# Patient Record
Sex: FEMALE | Race: BLACK OR AFRICAN AMERICAN | NOT HISPANIC OR LATINO | URBAN - METROPOLITAN AREA
[De-identification: names, ages, dates, MRNs, and addresses within clinical notes are randomized per-mention and may not be internally consistent; named-entity substitution may affect disease eponyms.]

---

## 2018-10-20 ENCOUNTER — INPATIENT (INPATIENT)
Facility: HOSPITAL | Age: 68
LOS: 3 days | Discharge: NURSING, REHAB OR LONG-TERM CARE | DRG: 65 | End: 2018-10-24
Attending: INTERNAL MEDICINE | Admitting: INTERNAL MEDICINE
Payer: MEDICARE

## 2018-10-20 VITALS
HEIGHT: 63 IN | DIASTOLIC BLOOD PRESSURE: 103 MMHG | TEMPERATURE: 99 F | RESPIRATION RATE: 18 BRPM | SYSTOLIC BLOOD PRESSURE: 184 MMHG | OXYGEN SATURATION: 96 % | WEIGHT: 164.91 LBS | HEART RATE: 123 BPM

## 2018-10-20 LAB
ALBUMIN SERPL ELPH-MCNC: 4 G/DL — SIGNIFICANT CHANGE UP (ref 3.3–5.2)
ALP SERPL-CCNC: 95 U/L — SIGNIFICANT CHANGE UP (ref 40–120)
ALT FLD-CCNC: 19 U/L — SIGNIFICANT CHANGE UP
ANION GAP SERPL CALC-SCNC: 13 MMOL/L — SIGNIFICANT CHANGE UP (ref 5–17)
APTT BLD: 27.2 SEC — LOW (ref 27.5–37.4)
AST SERPL-CCNC: 34 U/L — HIGH
BASOPHILS # BLD AUTO: 0 K/UL — SIGNIFICANT CHANGE UP (ref 0–0.2)
BASOPHILS NFR BLD AUTO: 0.3 % — SIGNIFICANT CHANGE UP (ref 0–2)
BILIRUB SERPL-MCNC: 0.7 MG/DL — SIGNIFICANT CHANGE UP (ref 0.4–2)
BUN SERPL-MCNC: 17 MG/DL — SIGNIFICANT CHANGE UP (ref 8–20)
CALCIUM SERPL-MCNC: 10.2 MG/DL — SIGNIFICANT CHANGE UP (ref 8.6–10.2)
CHLORIDE SERPL-SCNC: 98 MMOL/L — SIGNIFICANT CHANGE UP (ref 98–107)
CO2 SERPL-SCNC: 25 MMOL/L — SIGNIFICANT CHANGE UP (ref 22–29)
CREAT SERPL-MCNC: 0.75 MG/DL — SIGNIFICANT CHANGE UP (ref 0.5–1.3)
EOSINOPHIL # BLD AUTO: 0 K/UL — SIGNIFICANT CHANGE UP (ref 0–0.5)
EOSINOPHIL NFR BLD AUTO: 0.6 % — SIGNIFICANT CHANGE UP (ref 0–6)
GLUCOSE SERPL-MCNC: 238 MG/DL — HIGH (ref 70–115)
HCT VFR BLD CALC: 41.2 % — SIGNIFICANT CHANGE UP (ref 37–47)
HGB BLD-MCNC: 13.7 G/DL — SIGNIFICANT CHANGE UP (ref 12–16)
INR BLD: 1.12 RATIO — SIGNIFICANT CHANGE UP (ref 0.88–1.16)
LYMPHOCYTES # BLD AUTO: 1.6 K/UL — SIGNIFICANT CHANGE UP (ref 1–4.8)
LYMPHOCYTES # BLD AUTO: 20.2 % — SIGNIFICANT CHANGE UP (ref 20–55)
MCHC RBC-ENTMCNC: 27.9 PG — SIGNIFICANT CHANGE UP (ref 27–31)
MCHC RBC-ENTMCNC: 33.3 G/DL — SIGNIFICANT CHANGE UP (ref 32–36)
MCV RBC AUTO: 83.9 FL — SIGNIFICANT CHANGE UP (ref 81–99)
MONOCYTES # BLD AUTO: 0.4 K/UL — SIGNIFICANT CHANGE UP (ref 0–0.8)
MONOCYTES NFR BLD AUTO: 5.3 % — SIGNIFICANT CHANGE UP (ref 3–10)
NEUTROPHILS # BLD AUTO: 5.7 K/UL — SIGNIFICANT CHANGE UP (ref 1.8–8)
NEUTROPHILS NFR BLD AUTO: 73.5 % — HIGH (ref 37–73)
PLATELET # BLD AUTO: 266 K/UL — SIGNIFICANT CHANGE UP (ref 150–400)
POTASSIUM SERPL-MCNC: 5.1 MMOL/L — SIGNIFICANT CHANGE UP (ref 3.5–5.3)
POTASSIUM SERPL-SCNC: 5.1 MMOL/L — SIGNIFICANT CHANGE UP (ref 3.5–5.3)
PROT SERPL-MCNC: 8.4 G/DL — SIGNIFICANT CHANGE UP (ref 6.6–8.7)
PROTHROM AB SERPL-ACNC: 12.3 SEC — SIGNIFICANT CHANGE UP (ref 9.8–12.7)
RBC # BLD: 4.91 M/UL — SIGNIFICANT CHANGE UP (ref 4.4–5.2)
RBC # FLD: 14 % — SIGNIFICANT CHANGE UP (ref 11–15.6)
SODIUM SERPL-SCNC: 136 MMOL/L — SIGNIFICANT CHANGE UP (ref 135–145)
TROPONIN T SERPL-MCNC: <0.01 NG/ML — SIGNIFICANT CHANGE UP (ref 0–0.06)
WBC # BLD: 7.8 K/UL — SIGNIFICANT CHANGE UP (ref 4.8–10.8)
WBC # FLD AUTO: 7.8 K/UL — SIGNIFICANT CHANGE UP (ref 4.8–10.8)

## 2018-10-20 PROCEDURE — 70496 CT ANGIOGRAPHY HEAD: CPT | Mod: 26

## 2018-10-20 PROCEDURE — 99285 EMERGENCY DEPT VISIT HI MDM: CPT

## 2018-10-20 PROCEDURE — 70498 CT ANGIOGRAPHY NECK: CPT | Mod: 26

## 2018-10-20 PROCEDURE — 93010 ELECTROCARDIOGRAM REPORT: CPT

## 2018-10-20 RX ORDER — SODIUM CHLORIDE 9 MG/ML
1000 INJECTION INTRAMUSCULAR; INTRAVENOUS; SUBCUTANEOUS ONCE
Qty: 0 | Refills: 0 | Status: COMPLETED | OUTPATIENT
Start: 2018-10-20 | End: 2018-10-20

## 2018-10-20 RX ADMIN — SODIUM CHLORIDE 4000 MILLILITER(S): 9 INJECTION INTRAMUSCULAR; INTRAVENOUS; SUBCUTANEOUS at 23:33

## 2018-10-20 NOTE — ED PROVIDER NOTE - PHYSICAL EXAMINATION
Gen: NAD, AOx3  Head: NCAT  HEENT: PERRL, EOMI, oral mucosa moist, normal conjunctiva, neck supple  Lung: CTAB, no respiratory distress  CV: tachy regular, no murmur, Normal perfusion  Abd: soft, NTND  MSK: No edema, no visible deformities  Neuro: +slurred speech, +1/5 Lt UE strength, +3/5 Lt LE strength, no ataxia, CN II-XII intact  Skin: No rash   Psych: normal affect

## 2018-10-20 NOTE — ED ADULT NURSE NOTE - CHPI ED NUR SYMPTOMS NEG
no nausea/no vomiting/no change in level of consciousness/no numbness/no loss of consciousness/no confusion/no blurred vision

## 2018-10-20 NOTE — ED ADULT TRIAGE NOTE - CHIEF COMPLAINT QUOTE
Pt c/o generalized weakness since 8:30 this morning, weakness to left arm and leg noted, states she fell out of bed this AM and needed to be helped up by family, no hx of CVA, hx of HTN

## 2018-10-20 NOTE — ED PROVIDER NOTE - MEDICAL DECISION MAKING DETAILS
patient with concerning symtpoms for stroke, last known normal >24hrs, will get priority CT r/o bleed and CTA prior to Cr for possible embolectomy. labs. ekg. FS. TBA

## 2018-10-20 NOTE — ED PROVIDER NOTE - OBJECTIVE STATEMENT
68 year old F with Lt sided weakness woke up this mornign with it, thougth would go away and it didn't, unable to walk due to weakness. not worseing. +slurred speech. no trouble swallowing. no fever/chills. no trauma. has h/o HTN and rheumatic fever as child. no other PMH

## 2018-10-20 NOTE — ED ADULT NURSE NOTE - NSIMPLEMENTINTERV_GEN_ALL_ED
Implemented All Fall Risk Interventions:  [place] to call system. Call bell, personal items and telephone within reach. Instruct patient to call for assistance. Room bathroom lighting operational. Non-slip footwear when patient is off stretcher. Physically safe environment: no spills, clutter or unnecessary equipment. Stretcher in lowest position, wheels locked, appropriate side rails in place. Provide visual cue, wrist band, yellow gown, etc. Monitor gait and stability. Monitor for mental status changes and reorient to person, place, and time. Review medications for side effects contributing to fall risk. Reinforce activity limits and safety measures with patient and family.

## 2018-10-20 NOTE — ED PROVIDER NOTE - PROGRESS NOTE DETAILS
patient with significant stroke symptoms- left sided weakness. will get priority CT and CTA and proceed without creatinine in order to optimize ability for embolectomy if appropriate -Cande PARRY spoke with Dr. Lang radiologist @ [facility]- no acute occlusion on CTA, CT head - no bleed/shift/large infarct. pending full read by neuroradiologist -Cande PARRY

## 2018-10-20 NOTE — ED ADULT NURSE NOTE - OBJECTIVE STATEMENT
Pt arrives a&ox3, speaking coherently, and following commands pt noted to have left sided weakness, more so to upper extremity that started around 08:30 this morning. As per daughter and patient, symptoms are resolving as pt was noted to have slurred speech and less use of her extremities. Pt denies any hx aside from HTN. Pt states she did not take her HTN medication today.

## 2018-10-21 DIAGNOSIS — I63.9 CEREBRAL INFARCTION, UNSPECIFIED: ICD-10-CM

## 2018-10-21 DIAGNOSIS — I10 ESSENTIAL (PRIMARY) HYPERTENSION: ICD-10-CM

## 2018-10-21 DIAGNOSIS — R73.9 HYPERGLYCEMIA, UNSPECIFIED: ICD-10-CM

## 2018-10-21 DIAGNOSIS — I63.529: ICD-10-CM

## 2018-10-21 LAB — HBA1C BLD-MCNC: 7.7 % — HIGH (ref 4–5.6)

## 2018-10-21 PROCEDURE — 93880 EXTRACRANIAL BILAT STUDY: CPT | Mod: 26

## 2018-10-21 PROCEDURE — 70551 MRI BRAIN STEM W/O DYE: CPT | Mod: 26

## 2018-10-21 PROCEDURE — 71045 X-RAY EXAM CHEST 1 VIEW: CPT | Mod: 26

## 2018-10-21 PROCEDURE — 99223 1ST HOSP IP/OBS HIGH 75: CPT

## 2018-10-21 PROCEDURE — 12345: CPT | Mod: NC

## 2018-10-21 RX ORDER — DOCUSATE SODIUM 100 MG
100 CAPSULE ORAL THREE TIMES A DAY
Qty: 0 | Refills: 0 | Status: DISCONTINUED | OUTPATIENT
Start: 2018-10-21 | End: 2018-10-24

## 2018-10-21 RX ORDER — ASPIRIN/CALCIUM CARB/MAGNESIUM 324 MG
325 TABLET ORAL DAILY
Qty: 0 | Refills: 0 | Status: DISCONTINUED | OUTPATIENT
Start: 2018-10-21 | End: 2018-10-22

## 2018-10-21 RX ORDER — AMLODIPINE BESYLATE 2.5 MG/1
2.5 TABLET ORAL DAILY
Qty: 0 | Refills: 0 | Status: DISCONTINUED | OUTPATIENT
Start: 2018-10-21 | End: 2018-10-23

## 2018-10-21 RX ORDER — INFLUENZA VIRUS VACCINE 15; 15; 15; 15 UG/.5ML; UG/.5ML; UG/.5ML; UG/.5ML
0.5 SUSPENSION INTRAMUSCULAR ONCE
Qty: 0 | Refills: 0 | Status: COMPLETED | OUTPATIENT
Start: 2018-10-21 | End: 2018-10-21

## 2018-10-21 RX ORDER — SENNA PLUS 8.6 MG/1
2 TABLET ORAL AT BEDTIME
Qty: 0 | Refills: 0 | Status: DISCONTINUED | OUTPATIENT
Start: 2018-10-21 | End: 2018-10-24

## 2018-10-21 RX ORDER — ONDANSETRON 8 MG/1
4 TABLET, FILM COATED ORAL EVERY 6 HOURS
Qty: 0 | Refills: 0 | Status: DISCONTINUED | OUTPATIENT
Start: 2018-10-21 | End: 2018-10-24

## 2018-10-21 RX ORDER — ATORVASTATIN CALCIUM 80 MG/1
20 TABLET, FILM COATED ORAL AT BEDTIME
Qty: 0 | Refills: 0 | Status: DISCONTINUED | OUTPATIENT
Start: 2018-10-21 | End: 2018-10-24

## 2018-10-21 RX ORDER — POLYETHYLENE GLYCOL 3350 17 G/17G
17 POWDER, FOR SOLUTION ORAL DAILY
Qty: 0 | Refills: 0 | Status: DISCONTINUED | OUTPATIENT
Start: 2018-10-21 | End: 2018-10-24

## 2018-10-21 RX ORDER — ENOXAPARIN SODIUM 100 MG/ML
40 INJECTION SUBCUTANEOUS DAILY
Qty: 0 | Refills: 0 | Status: DISCONTINUED | OUTPATIENT
Start: 2018-10-21 | End: 2018-10-24

## 2018-10-21 RX ADMIN — ENOXAPARIN SODIUM 40 MILLIGRAM(S): 100 INJECTION SUBCUTANEOUS at 10:54

## 2018-10-21 RX ADMIN — ONDANSETRON 4 MILLIGRAM(S): 8 TABLET, FILM COATED ORAL at 10:54

## 2018-10-21 RX ADMIN — ATORVASTATIN CALCIUM 20 MILLIGRAM(S): 80 TABLET, FILM COATED ORAL at 21:35

## 2018-10-21 RX ADMIN — Medication 100 MILLIGRAM(S): at 21:35

## 2018-10-21 RX ADMIN — Medication 325 MILLIGRAM(S): at 01:33

## 2018-10-21 RX ADMIN — SENNA PLUS 2 TABLET(S): 8.6 TABLET ORAL at 21:35

## 2018-10-21 RX ADMIN — SODIUM CHLORIDE 1000 MILLILITER(S): 9 INJECTION INTRAMUSCULAR; INTRAVENOUS; SUBCUTANEOUS at 01:31

## 2018-10-21 NOTE — H&P ADULT - PROBLEM SELECTOR PLAN 1
pt. will be admitted to stroke unit, stroke protocol, aspirin, zocor, will get MRI brain,  echo, carotid doppler. neurology consult. pt. will be admitted to stroke unit, stroke protocol, aspirin, zocor, will get MRI brain,  echo, carotid doppler. neurology consult, dr. Mullins group.

## 2018-10-21 NOTE — DISCHARGE NOTE ADULT - PATIENT PORTAL LINK FT
You can access the [facility] Patient Portal, offered by [facility], by registering with the following website: [email or web address]

## 2018-10-21 NOTE — DISCHARGE NOTE ADULT - HOSPITAL COURSE
68 year old female htn, acute cva Right Internal Capsule with left internal capsule stroke, constipation. 10/22 Patient had ONEYDA which was within within normal limits.  Patient may be discharged to rehab.     Problem/Plan - 1:  ·  Problem: Cerebrovascular accident (CVA) due to occlusion of anterior cerebral artery, unspecified blood vessel laterality.  Plan: Patient with left sided weakness. PT eval suggests rehab.  Aspirin and statin. Right Internal Capsule infarct.      Problem/Plan - 2:  ·  Problem: Essential hypertension.  Plan: BP minimally elevated with amlodipine and metoprolol.  Increase amlodipine from 2.5 to 5 mg daily and monitor bp closely.     Problem/Plan - 3:  ·  Problem: Elevated blood sugar.  Plan: Monitor glucose, continue scale coverage  A1C 7.7  Add metformin as an outpatient.     Attending Attestation:   I was physically present for the key portions of the evaluation and management (E/M) service provided.  I agree with the above history, physical, and plan which I have reviewed and edited where appropriate.     49 minutes spent on total encounter; more than 50% of the visit was spent counseling and/or coordinating care by the attending physician.

## 2018-10-21 NOTE — DISCHARGE NOTE ADULT - SECONDARY DIAGNOSIS.
Essential hypertension Mixed hyperlipidemia Type 2 diabetes mellitus with diabetic polyneuropathy, without long-term current use of insulin

## 2018-10-21 NOTE — H&P ADULT - NSHPPHYSICALEXAM_GEN_ALL_CORE
General: Well developed AA female lying in bed in NAD.   HEENT: AT, NC. PERRL. intact EOM. no throat erythema or exudate. mild left sided facial droop noted. no nystagmus.   Neck: supple. no JVD.   Chest: CTA bilaterally. no  w / r / r.   Heart: normal S1,S2. RRR. no heart murmur.  Abdomen: soft. non-tender. non-distended. + BS.  Rectal : deferred by pt.   Ext: no C/C/E. no calf tenderness.  Vascular : 2 + DP B/L.   Neuro: AAO x3. no slurred speech noted. LUE motor about 1 to 2 out of 5. LLE 3 out of 5 . diminished deep tendon reflexes over left upper / lower ext. sensory intact.   Skin: no rash noted. no warmth. no diaphoresis.   Psychiatric : pt. co-operative. mood ok. no anxiety .

## 2018-10-21 NOTE — DISCHARGE NOTE ADULT - CARE PLAN
Principal Discharge DX:	Cerebrovascular accident (CVA) due to occlusion of anterior cerebral artery, unspecified blood vessel laterality  Goal:	Prevent further stroke  Assessment and plan of treatment:	Aspirin, statin and transfer to skilled nursing facility  Secondary Diagnosis:	Essential hypertension  Goal:	Tolerable BP  Secondary Diagnosis:	Mixed hyperlipidemia  Goal:	LDL less than 70  Secondary Diagnosis:	Type 2 diabetes mellitus with diabetic polyneuropathy, without long-term current use of insulin  Goal:	Tolerable glycemia

## 2018-10-21 NOTE — H&P ADULT - PROBLEM SELECTOR PLAN 3
pt. reports no h/o DM, random blood glucose in [facility] is 238, HbA1c will be followed. will do accuchk for 24 hours and see the trend, will request day team to follow.

## 2018-10-21 NOTE — DISCHARGE NOTE ADULT - MEDICATION SUMMARY - MEDICATIONS TO TAKE
I will START or STAY ON the medications listed below when I get home from the hospital:    aspirin 325 mg oral delayed release tablet  -- 1 tab(s) by mouth once a day  -- Indication: For Cerebral infarction    metFORMIN 500 mg oral tablet  -- 1 tab(s) by mouth 2 times a day  -- Indication: For Diabetes    atorvastatin 20 mg oral tablet  -- 1 tab(s) by mouth once a day (at bedtime)  -- Indication: For Hyperlipidemia    metoprolol succinate 50 mg oral tablet, extended release  -- 1 tab(s) by mouth once a day  -- Indication: For HTN (hypertension)    Norvasc 5 mg oral tablet  -- 1 tab(s) by mouth once a day  -- Indication: For HTN (hypertension)    nystatin 100,000 units/g topical powder  -- 1 application on skin 2 times a day  -- Indication: For Rash    polyethylene glycol 3350 oral powder for reconstitution  -- 17 gram(s) by mouth once a day  -- Indication: For Constipation    senna oral tablet  -- 2 tab(s) by mouth once a day (at bedtime)  -- Indication: For Constipation    docusate sodium 100 mg oral capsule  -- 1 cap(s) by mouth 3 times a day  -- Indication: For Constipation

## 2018-10-21 NOTE — DISCHARGE NOTE ADULT - CARE PROVIDER_API CALL
Issac Cruz), Neurology  [address] [place] [address]  Phone: [phone]  Fax: [phone]

## 2018-10-21 NOTE — DISCHARGE NOTE ADULT - NS AS DC FOLLOWUP STROKE INST
Stroke (includes: TIA/SAH/ICH/Ischemic Stroke) Smoking Cessation/Stroke (includes: TIA/SAH/ICH/Ischemic Stroke) Smoking Cessation

## 2018-10-21 NOTE — DISCHARGE NOTE ADULT - PRINCIPAL DIAGNOSIS
Cerebrovascular accident (CVA) due to occlusion of anterior cerebral artery, unspecified blood vessel laterality

## 2018-10-21 NOTE — H&P ADULT - HISTORY OF PRESENT ILLNESS
69 y/o female who went to bed around 1 am on 10/20/18 without any complaints and when woke up in the morning noticed weakness in her whole left side upper more than lower. pt. tried to get up but could not due to weakness. pt. thought weakness will go away and did not come to the hospital till 9 : 00 pm on 10/20/18. As per pt. her speech was normal. no swallowing difficulty. Pt. did not notice any sig. facial droop but as per pt. her daughter noted mild left facial droop. mild headache. no LOC. no cp. no vision changes. no abd. apin. no nv/d. no fever.

## 2018-10-21 NOTE — PROGRESS NOTE ADULT - SUBJECTIVE AND OBJECTIVE BOX
SYL Lamar     Chief Complaint: Patient is a 68y old  Female who presents with a chief complaint of left sided weakness (21 Oct 2018 08:46)      PAST MEDICAL & SURGICAL HISTORY:  HTN (hypertension)  No significant past surgical history      HPI/OVERNIGHT EVENTS: Patient lying in bed constipated left side hemiparesis.    MEDICATIONS  (STANDING):  aspirin enteric coated 325 milliGRAM(s) Oral daily  atorvastatin 20 milliGRAM(s) Oral at bedtime  enoxaparin Injectable 40 milliGRAM(s) SubCutaneous daily      Vital Signs Last 24 Hrs  T(C): 37.3 (21 Oct 2018 07:45), Max: 37.3 (21 Oct 2018 07:45)  T(F): 99.1 (21 Oct 2018 07:45), Max: 99.1 (21 Oct 2018 07:45)  HR: 106 (21 Oct 2018 07:45) (96 - 123)  BP: 180/88 (21 Oct 2018 07:45) (163/89 - 184/103)  BP(mean): --  RR: 18 (21 Oct 2018 07:45) (18 - 18)  SpO2: 96% (21 Oct 2018 07:45) (96% - 99%)    PHYSICAL EXAM:  Constitutional:  Left sided hemiparesis  HEENT: PERRLA, EOMI, Normal Hearing, MMM  Neck: No LAD, No JVD  Back: Normal spine flexure, No CVA tenderness  Respiratory: CTAB Cardiovascular: S1 and S2, RRR, no M/G/R  Gastrointestinal: BS+, soft, NT/ND  Extremities: No peripheral edema  Vascular: 2+ peripheral pulses  Neurological: A/O x 3, Left side hemiparesis    CAPILLARY BLOOD GLUCOSE    LABS:                        13.7   7.8   )-----------( 266      ( 20 Oct 2018 22:31 )             41.2     10-20    136  |  98  |  17.0  ----------------------------<  238<H>  5.1   |  25.0  |  0.75    Ca    10.2      20 Oct 2018 22:31    TPro  8.4  /  Alb  4.0  /  TBili  0.7  /  DBili  x   /  AST  34<H>  /  ALT  19  /  AlkPhos  95  10-20    PT/INR - ( 20 Oct 2018 22:31 )   PT: 12.3 sec;   INR: 1.12 ratio         PTT - ( 20 Oct 2018 22:31 )  PTT:27.2 sec      RADIOLOGY & ADDITIONAL TESTS:

## 2018-10-21 NOTE — CONSULT NOTE ADULT - SUBJECTIVE AND OBJECTIVE BOX
HPI:  69 y/o female who went to bed around 1 am on 10/20/18 without any complaints and when woke up in the morning noticed weakness in her whole left side upper more than lower was alaso feeling dizziness and headache, was having headache and upper respiratory symptoms since thursday. today head and dizziness are better, but still weak on left side, no bulbar symtoms affecting speech, swallow. CTA neck and head were not showing any major vessel occlusion, but did left cerebellar hypodensity. patient started on asa, was not on any antiplatelet at home  PAST MEDICAL & SURGICAL HISTORY:  HTN (hypertension)  No significant past surgical history      REVIEW OF SYSTEMS:    CONSTITUTIONAL: No fever, weight loss, or fatigue  EYES: No eye pain, visual disturbances, or discharge  ENMT:  No difficulty hearing, tinnitus, vertigo; No sinus or throat pain  NECK: No pain or stiffness  RESPIRATORY: No cough, wheezing, chills or hemoptysis; No shortness of breath  CARDIOVASCULAR: No chest pain, palpitations, dizziness, or leg swelling  GASTROINTESTINAL: No abdominal or epigastric pain. No nausea, vomiting, or hematemesis; No diarrhea or constipation. No melena or hematochezia.  GENITOURINARY: No dysuria, frequency, hematuria, or incontinence  NEUROLOGICAL: No memory loss, loss of strength, numbness, or tremors  SKIN: No itching, burning, rashes, or lesions   LYMPH NODES: No enlarged glands  ENDOCRINE: No heat or cold intolerance; No hair loss  MUSCULOSKELETAL: No joint pain or swelling; No muscle, back, or extremity pain  PSYCHIATRIC: No depression, anxiety, mood swings, or difficulty sleeping  HEME/LYMPH: No easy bruising, or bleeding gums    MEDICATIONS  (STANDING):  aspirin enteric coated 325 milliGRAM(s) Oral daily  atorvastatin 20 milliGRAM(s) Oral at bedtime  enoxaparin Injectable 40 milliGRAM(s) SubCutaneous daily    MEDICATIONS  (PRN):      Allergies    No Known Allergies    Intolerances        SOCIAL HISTORY:    FAMILY HISTORY:  Family history of cerebrovascular accident (CVA)      PHYSICAL EXAM:  Vital Signs Last 24 Hrs  T(F): 99.1 (10-21-18 @ 07:45)  HR: 106 (10-21-18 @ 07:45)  BP: 180/88 (10-21-18 @ 07:45)  RR: 18 (10-21-18 @ 07:45)    GENERAL: NAD, well-groomed, well-developed  HEAD:  Atraumatic, Normocephalic  EYES: EOMI, PERRLA,   NECK: Supple, no carotid bruit bilateral  NERVOUS SYSTEM:  Alert & Oriented X3, speech and language normal, cranial nerves II-XII normal,   Good concentration; Motor Strength 5/5 right  upper and lower extremities; left ue and left LE 3-3+/5, DTRs 1+ intact and symmetric, plantar responses flexor right. extensor  left side.  sensory intact  light touch.   HEART: Regular rate and rhythm; No murmurs, rubs, or gallops          LABS:                        13.7   7.8   )-----------( 266      ( 20 Oct 2018 22:31 )             41.2     10-20    136  |  98  |  17.0  ----------------------------<  238<H>  5.1   |  25.0  |  0.75    Ca    10.2      20 Oct 2018 22:31    TPro  8.4  /  Alb  4.0  /  TBili  0.7  /  DBili  x   /  AST  34<H>  /  ALT  19  /  AlkPhos  95  10-20    PT/INR - ( 20 Oct 2018 22:31 )   PT: 12.3 sec;   INR: 1.12 ratio         PTT - ( 20 Oct 2018 22:31 )  PTT:27.2 sec      RADIOLOGY & ADDITIONAL STUDIES:

## 2018-10-21 NOTE — ED ADULT NURSE REASSESSMENT NOTE - NS ED NURSE REASSESS COMMENT FT1
at US
discussed plan of care with patient patient is comfortable and to be cleaned iv 20g intact SL at this time.
Pt. is resting comfortably, in no apparent distress, family at bedside, call bell within reach, stretcher in lowest position with side rails up. Pt. neuro status the same as this morning, see full neuro assessment. Pt. and family educated on plan of care. BP consistently at 180/80, HR 85, 99.0 F oral temp, RR 18, 99% O2 sat on room air. Will continue to monitor pt. and transfer to appropriate assigned unit.

## 2018-10-21 NOTE — DISCHARGE NOTE ADULT - PLAN OF CARE
Prevent further stroke Aspirin, statin and transfer to skilled nursing facility Tolerable BP LDL less than 70 Tolerable glycemia

## 2018-10-22 DIAGNOSIS — I10 ESSENTIAL (PRIMARY) HYPERTENSION: ICD-10-CM

## 2018-10-22 LAB
ANION GAP SERPL CALC-SCNC: 12 MMOL/L — SIGNIFICANT CHANGE UP (ref 5–17)
BUN SERPL-MCNC: 12 MG/DL — SIGNIFICANT CHANGE UP (ref 8–20)
CALCIUM SERPL-MCNC: 9.7 MG/DL — SIGNIFICANT CHANGE UP (ref 8.6–10.2)
CHLORIDE SERPL-SCNC: 102 MMOL/L — SIGNIFICANT CHANGE UP (ref 98–107)
CHOLEST SERPL-MCNC: 196 MG/DL — SIGNIFICANT CHANGE UP (ref 110–199)
CO2 SERPL-SCNC: 24 MMOL/L — SIGNIFICANT CHANGE UP (ref 22–29)
CREAT SERPL-MCNC: 0.7 MG/DL — SIGNIFICANT CHANGE UP (ref 0.5–1.3)
GLUCOSE BLDC GLUCOMTR-MCNC: 131 MG/DL — HIGH (ref 70–99)
GLUCOSE SERPL-MCNC: 151 MG/DL — HIGH (ref 70–115)
HCT VFR BLD CALC: 38.1 % — SIGNIFICANT CHANGE UP (ref 37–47)
HDLC SERPL-MCNC: 65 MG/DL — SIGNIFICANT CHANGE UP
HGB BLD-MCNC: 12.5 G/DL — SIGNIFICANT CHANGE UP (ref 12–16)
LIPID PNL WITH DIRECT LDL SERPL: 108 MG/DL — SIGNIFICANT CHANGE UP
MAGNESIUM SERPL-MCNC: 2 MG/DL — SIGNIFICANT CHANGE UP (ref 1.6–2.6)
MCHC RBC-ENTMCNC: 27.9 PG — SIGNIFICANT CHANGE UP (ref 27–31)
MCHC RBC-ENTMCNC: 32.8 G/DL — SIGNIFICANT CHANGE UP (ref 32–36)
MCV RBC AUTO: 85 FL — SIGNIFICANT CHANGE UP (ref 81–99)
PHOSPHATE SERPL-MCNC: 3.8 MG/DL — SIGNIFICANT CHANGE UP (ref 2.4–4.7)
PLATELET # BLD AUTO: 216 K/UL — SIGNIFICANT CHANGE UP (ref 150–400)
POTASSIUM SERPL-MCNC: 4.1 MMOL/L — SIGNIFICANT CHANGE UP (ref 3.5–5.3)
POTASSIUM SERPL-SCNC: 4.1 MMOL/L — SIGNIFICANT CHANGE UP (ref 3.5–5.3)
RBC # BLD: 4.48 M/UL — SIGNIFICANT CHANGE UP (ref 4.4–5.2)
RBC # FLD: 14.1 % — SIGNIFICANT CHANGE UP (ref 11–15.6)
SODIUM SERPL-SCNC: 138 MMOL/L — SIGNIFICANT CHANGE UP (ref 135–145)
TOTAL CHOLESTEROL/HDL RATIO MEASUREMENT: 3 RATIO — LOW (ref 3.3–7.1)
TRIGL SERPL-MCNC: 116 MG/DL — SIGNIFICANT CHANGE UP (ref 10–200)
TSH SERPL-MCNC: 1.39 UIU/ML — SIGNIFICANT CHANGE UP (ref 0.27–4.2)
WBC # BLD: 6.2 K/UL — SIGNIFICANT CHANGE UP (ref 4.8–10.8)
WBC # FLD AUTO: 6.2 K/UL — SIGNIFICANT CHANGE UP (ref 4.8–10.8)

## 2018-10-22 PROCEDURE — 93312 ECHO TRANSESOPHAGEAL: CPT | Mod: 26

## 2018-10-22 PROCEDURE — 99233 SBSQ HOSP IP/OBS HIGH 50: CPT

## 2018-10-22 PROCEDURE — 76376 3D RENDER W/INTRP POSTPROCES: CPT | Mod: 26

## 2018-10-22 PROCEDURE — 93320 DOPPLER ECHO COMPLETE: CPT | Mod: 26

## 2018-10-22 PROCEDURE — 93306 TTE W/DOPPLER COMPLETE: CPT | Mod: 26

## 2018-10-22 PROCEDURE — 93325 DOPPLER ECHO COLOR FLOW MAPG: CPT | Mod: 26

## 2018-10-22 RX ORDER — ASPIRIN/CALCIUM CARB/MAGNESIUM 324 MG
325 TABLET ORAL DAILY
Qty: 0 | Refills: 0 | Status: DISCONTINUED | OUTPATIENT
Start: 2018-10-22 | End: 2018-10-24

## 2018-10-22 RX ORDER — METOPROLOL TARTRATE 50 MG
50 TABLET ORAL DAILY
Qty: 0 | Refills: 0 | Status: DISCONTINUED | OUTPATIENT
Start: 2018-10-22 | End: 2018-10-24

## 2018-10-22 RX ADMIN — SENNA PLUS 2 TABLET(S): 8.6 TABLET ORAL at 21:39

## 2018-10-22 RX ADMIN — ATORVASTATIN CALCIUM 20 MILLIGRAM(S): 80 TABLET, FILM COATED ORAL at 21:39

## 2018-10-22 RX ADMIN — Medication 50 MILLIGRAM(S): at 02:21

## 2018-10-22 RX ADMIN — Medication 100 MILLIGRAM(S): at 21:39

## 2018-10-22 RX ADMIN — AMLODIPINE BESYLATE 2.5 MILLIGRAM(S): 2.5 TABLET ORAL at 04:54

## 2018-10-22 RX ADMIN — ENOXAPARIN SODIUM 40 MILLIGRAM(S): 100 INJECTION SUBCUTANEOUS at 11:40

## 2018-10-22 RX ADMIN — Medication 325 MILLIGRAM(S): at 11:40

## 2018-10-22 NOTE — CONSULT NOTE ADULT - ATTENDING COMMENTS
Acute infarct of the right internal capsule in a hypertensive patient  Scheduled for ONEYDA on Monday to r/o cardioembolic source.

## 2018-10-22 NOTE — CONSULT NOTE ADULT - ATTENDING COMMENTS
Patient seen and examined with resident physician, Dr. Swain. Agree with above history and physical examination. Ms. Pizano presented with acute onset left sided hemiparesis and was found to have acute right internal capsule ischemic CVA. She has a family history of CVA (father) and co-morbid factors including HTN and newly diagnosed DM. Cardiac evaluation is pending. She is progressing well with bedside therapy. When she is medically cleared, would recommend a course of acute inpatient rehabilitation for the functional deficits consisting of 3 hours of therapy/day & 24 hour RN/daily PMR physician for comorbid medical management. Patient seen and examined on 10/23.   Agree with resident and fellow.  Rehab - Recommend ACUTE inpatient rehabilitation for the functional deficits consisting of 3 hours of therapy/day & 24 hour RN/daily PMR physician for comorbid medical management. Will continue to follow for ongoing rehab needs and recommendations. Patient will be able to tolerate 3 hours a day.    Continue bedside therapy as well as OOB throughout the day with mobilization throughout the day with staff to maintain cardiopulmonary function and prevention of secondary complications related to debility.

## 2018-10-22 NOTE — PHYSICAL THERAPY INITIAL EVALUATION ADULT - IMPAIRMENTS FOUND, PT EVAL
aerobic capacity/endurance/ROM/muscle strength/neuromotor development and sensory integration/gait, locomotion, and balance

## 2018-10-22 NOTE — PROGRESS NOTE ADULT - SUBJECTIVE AND OBJECTIVE BOX
[facility]     Chief Complaint: Patient is a 68y old  Female who presents with a chief complaint of left sided weakness (22 Oct 2018 10:21)      PAST MEDICAL & SURGICAL HISTORY:  HTN (hypertension)  No significant past surgical history      HPI/OVERNIGHT EVENTS: Patient sitting in chair, awaiting ONEYDA    MEDICATIONS  (STANDING):  amLODIPine   Tablet 2.5 milliGRAM(s) Oral daily  aspirin enteric coated 325 milliGRAM(s) Oral daily  atorvastatin 20 milliGRAM(s) Oral at bedtime  docusate sodium 100 milliGRAM(s) Oral three times a day  enoxaparin Injectable 40 milliGRAM(s) SubCutaneous daily  influenza   Vaccine 0.5 milliLiter(s) IntraMuscular once  metoprolol succinate ER 50 milliGRAM(s) Oral daily  polyethylene glycol 3350 17 Gram(s) Oral daily  senna 2 Tablet(s) Oral at bedtime      Vital Signs Last 24 Hrs  T(C): 37.2 (22 Oct 2018 13:58), Max: 37.4 (22 Oct 2018 00:07)  T(F): 98.9 (22 Oct 2018 13:58), Max: 99.3 (22 Oct 2018 00:07)  HR: 90 (22 Oct 2018 11:46) (75 - 103)  BP: 171/62 (22 Oct 2018 11:46) (114/93 - 185/72)  BP(mean): 93 (22 Oct 2018 11:46) (93 - 117)  RR: 16 (22 Oct 2018 11:46) (14 - 21)  SpO2: 98% (22 Oct 2018 11:46) (96% - 100%)    PHYSICAL EXAM:  Constitutional:  Left sided hemiparesis arms worse than legs  HEENT: PERRLA, EOMI, Normal Hearing, MMM  Neck: No LAD, No JVD  Back: Normal spine flexure, No CVA tenderness  Respiratory: CTAB Cardiovascular: S1 and S2, RRR, no M/G/R  Gastrointestinal: BS+, soft, NT/ND  Extremities: No peripheral edema  Vascular: 2+ peripheral pulses  Neurological: A/O x 3 left sided jessica paresis arms worse than legs    CAPILLARY BLOOD GLUCOSE    LABS:                        12.5   6.2   )-----------( 216      ( 22 Oct 2018 06:17 )             38.1     10-22    138  |  102  |  12.0  ----------------------------<  151<H>  4.1   |  24.0  |  0.70    Ca    9.7      22 Oct 2018 06:17  Phos  3.8     10-22  Mg     2.0     10-22    TPro  8.4  /  Alb  4.0  /  TBili  0.7  /  DBili  x   /  AST  34<H>  /  ALT  19  /  AlkPhos  95  10-20    PT/INR - ( 20 Oct 2018 22:31 )   PT: 12.3 sec;   INR: 1.12 ratio         PTT - ( 20 Oct 2018 22:31 )  PTT:27.2 sec      RADIOLOGY & ADDITIONAL TESTS:

## 2018-10-22 NOTE — PHYSICAL THERAPY INITIAL EVALUATION ADULT - ACTIVE RANGE OF MOTION EXAMINATION, REHAB EVAL
left UE 2-/5, left LE 3-/5/Right UE Active ROM was WFL (within functional limits)/Right LE Active ROM was WFL (within functional limits)

## 2018-10-22 NOTE — OCCUPATIONAL THERAPY INITIAL EVALUATION ADULT - PRECAUTIONS/LIMITATIONS, REHAB EVAL
aspiration precautions/fall precautions/supervision with meals; head of bed 30 degrees/seizure precautions

## 2018-10-22 NOTE — PROGRESS NOTE ADULT - ASSESSMENT
68 year old female htn, acute cva Right Internal Capsule with left internal capsule stroke, constipation. 10/22 Patient scheduled for ONEYDA.

## 2018-10-22 NOTE — OCCUPATIONAL THERAPY INITIAL EVALUATION ADULT - MANUAL MUSCLE TESTING RESULTS, REHAB EVAL
left shoulder grossly assessment with partial AROM against gravity 2/5, left elbow grossly assessed with AROM against gravity 3/5, left gross grasp 3/5

## 2018-10-22 NOTE — OCCUPATIONAL THERAPY INITIAL EVALUATION ADULT - MODIFIED CLINICAL TEST OF SENSORY INTEGRATION IN BALANCE TEST
standing balance = poor, pt with difficulty maintaining upright posture in standing requiring maximum assistance

## 2018-10-22 NOTE — OCCUPATIONAL THERAPY INITIAL EVALUATION ADULT - GENERAL OBSERVATIONS, REHAB EVAL
Received pt seated in bedside chair, + IV lock, +telemetry, CCC present with pt finishing assessment; pt agrees to OT.

## 2018-10-22 NOTE — OCCUPATIONAL THERAPY INITIAL EVALUATION ADULT - PLANNED THERAPY INTERVENTIONS, OT EVAL
ADL retraining/balance training/fine motor coordination training/motor coordination training/bed mobility training/strengthening/transfer training/neuromuscular re-education/ROM/toilet

## 2018-10-22 NOTE — CONSULT NOTE ADULT - SUBJECTIVE AND OBJECTIVE BOX
History obtained by: Patient and medical record     obtained: No    Chief complaint:  "I had a stroke"    HPI:  This is 69 y/o female with hx of HTN and untreated DM who went to sleep on 10/20/18 without any complains and when she woke up in the morning she noticed weakness in her hwole left side. Speech was normal, no swallowing difficulty but she did have a mild facial droop. Head CT showed moderate size infarct in the inferior right cerebellar hemisphere. Brain MRI confirmed acute infarct.   Pt states she did not take her Toprol for 2 days prior to the stroke. Her BP was 184/103 on arrival to the ER. Pt denies chest pain, palpitations, SOB or leg swelling. She had rheumatic fever as a child but never underwent any cardiac testing or experienced any cardiac issues. Telemetry shows regular rhythm 80's-110's, no ectopy. EKG reveals  bpm with no acute ST changes. Pt never smoked, denies ETOH or drug use.        REVIEW OF SYMPTOMS:   Cardiovascular:  as per HPI  Respiratory:  denies dyspnea,   cough,     Genitourinary:  No dysuria, no hematuria;   Gastrointestinal:   No dark color stool, no melena, no diarrhea, no constipation, no abdominal pain;   Neurological: as per HPI  Psychiatric: No agitation, no anxiety.  ALL OTHER REVIEW OF SYSTEMS ARE NEGATIVE.    MEDICATIONS  (STANDING):  amLODIPine   Tablet 2.5 milliGRAM(s) Oral daily  aspirin enteric coated 325 milliGRAM(s) Oral daily  atorvastatin 20 milliGRAM(s) Oral at bedtime  docusate sodium 100 milliGRAM(s) Oral three times a day  enoxaparin Injectable 40 milliGRAM(s) SubCutaneous daily  influenza   Vaccine 0.5 milliLiter(s) IntraMuscular once  polyethylene glycol 3350 17 Gram(s) Oral daily  senna 2 Tablet(s) Oral at bedtime    MEDICATIONS  (PRN):  ondansetron Injectable 4 milliGRAM(s) IV Push every 6 hours PRN Nausea and/or Vomiting        PAST MEDICAL & SURGICAL HISTORY:  HTN (hypertension)  No significant past surgical history      FAMILY HISTORY:  Family history of cerebrovascular accident (CVA)      SOCIAL HISTORY: lives with daughter and grandchildren    CIGARETTES: never smoked    ALCOHOL: denies    DRUGS: denies      Vital Signs Last 24 Hrs  T(C): 37.4 (22 Oct 2018 00:07), Max: 37.4 (22 Oct 2018 00:07)  T(F): 99.3 (22 Oct 2018 00:07), Max: 99.3 (22 Oct 2018 00:07)  HR: 85 (21 Oct 2018 20:30) (85 - 106)  BP: 114/93 (21 Oct 2018 20:30) (114/93 - 180/88)  BP(mean): 101 (21 Oct 2018 20:30) (101 - 115)  RR: 18 (21 Oct 2018 20:30) (18 - 20)  SpO2: 100% (21 Oct 2018 20:30) (96% - 100%)    PHYSICAL EXAM:  General: WN/WD NAD  Neurology: A&Ox3, left side weakness  Eyes: PERRL/ EOMI, Gross vision intact  ENT/Neck: Neck supple, trachea midline, No JVD, Gross hearing intact  Respiratory: CTA B/L, No wheezing, rales, rhonchi  CV: RRR, S1S2, no murmurs  Abdominal: Soft, NT, ND +BS,   Extremities: No edema, + peripheral pulses  Skin: No Rashes, Hematoma, Ecchymosis        INTERPRETATION OF TELEMETRY: SR-ST 80's-110's, no ectopy    ECG:  bpm      I&O's Detail    21 Oct 2018 07:01  -  22 Oct 2018 00:54  --------------------------------------------------------  IN:    Oral Fluid: 100 mL  Total IN: 100 mL    OUT:  Total OUT: 0 mL    Total NET: 100 mL          LABS:                        13.7   7.8   )-----------( 266      ( 20 Oct 2018 22:31 )             41.2     10-20    136  |  98  |  17.0  ----------------------------<  238<H>  5.1   |  25.0  |  0.75    Ca    10.2      20 Oct 2018 22:31    TPro  8.4  /  Alb  4.0  /  TBili  0.7  /  DBili  x   /  AST  34<H>  /  ALT  19  /  AlkPhos  95  10-20    CARDIAC MARKERS ( 20 Oct 2018 22:31 )  x     / <0.01 ng/mL / x     / x     / x          PT/INR - ( 20 Oct 2018 22:31 )   PT: 12.3 sec;   INR: 1.12 ratio         PTT - ( 20 Oct 2018 22:31 )  PTT:27.2 sec    I&O's Summary    21 Oct 2018 07:01  -  22 Oct 2018 00:54  --------------------------------------------------------  IN: 100 mL / OUT: 0 mL / NET: 100 mL        RADIOLOGY & ADDITIONAL STUDIES:    < from: CT Angio Head w/ IV Cont (10.20.18 @ 22:46) >  IMPRESSION:   Noncontrast head CT scan: Moderate sized in the inferior right cerebellar   hemisphere that is probably acute.  Follow-up MRI can be obtained for   further characterization.      CT angiography neck: The cervical vasculature is patent without evidence   of atherosclerosis. No hemodynamically significant carotid stenosis or   flow-limiting vertebral artery stenosis.  No evidence of dissection.    CT angiography brain: No major vessel occlusion, stenosis or aneurysm   about the [place] of .      TRISHA PARKINSON M.D.,ATTENDING RADIOLOGIST  This document has been electronically signed. Oct 21 2018 12:03AM    < end of copied text >    < from: MR Head No Cont (10.21.18 @ 13:47) >  IMPRESSION:      1)  acute infarct in the region of the posterior limb of the right   internal capsule..  2)  chronic ischemic changes in both hemispheres. No acute posterior   fossa lesion or acuteinfarct noted..     CASEY BERKOWITZ M.D., ATTENDING RADIOLOGIST  This document has been electronically signed. Oct 21 2018  1:53PM    < end of copied text >      PREVIOUS DIAGNOSTIC TESTING:      ECHO: n/a    STRESS: n/a      CATHETERIZATION: n/a

## 2018-10-22 NOTE — CONSULT NOTE ADULT - ASSESSMENT
possible stroke, although other possiblities neoplasm, abcess also possible, mri report still not avaialble, cta negative for cerebrovascular disease, if mri +for stroke then will need cardiac naylor including ONEYDA and ILR. ok to c/w asa for the time being.
67 y/o female presents with left side weakness and mild facial droop, found to have acute infarct of the right internal capsule.

## 2018-10-22 NOTE — CONSULT NOTE ADULT - PROBLEM SELECTOR RECOMMENDATION 9
see above
-start ASA and statin  -ONEYDA to r/o cardiogenic emboli etiology  -telemetry monitoring  -carotid dopplers

## 2018-10-22 NOTE — PHYSICAL THERAPY INITIAL EVALUATION ADULT - ADDITIONAL COMMENTS
Pt. lives in the private house with family, 4 steps to enter (+) rail, (-) DME, (+) driving. Family not available to assist pt. upon D/C home due to full time job.

## 2018-10-22 NOTE — PHYSICAL THERAPY INITIAL EVALUATION ADULT - CRITERIA FOR SKILLED THERAPEUTIC INTERVENTIONS
risk reduction/prevention/rehab potential/anticipated discharge recommendation/functional limitations in following categories/impairments found/therapy frequency/anticipated equipment needs at discharge/predicted duration of therapy intervention

## 2018-10-22 NOTE — OCCUPATIONAL THERAPY INITIAL EVALUATION ADULT - SENSORY TESTS
pt denies changes with sensation; pt with +bilateral pedal pulses and +left radial pulse; pt with +capillary refill in left digits

## 2018-10-22 NOTE — OCCUPATIONAL THERAPY INITIAL EVALUATION ADULT - ADDITIONAL COMMENTS
Pt lives in house with 3 [address] and no steps inside; bedroom and bathroom are on main level. Bathroom has bathtub with curtains. Pt does not own any DME. Pt is right handed. Pt drives.

## 2018-10-22 NOTE — OCCUPATIONAL THERAPY INITIAL EVALUATION ADULT - PERTINENT HX OF CURRENT PROBLEM, REHAB EVAL
Pt presents to ED with c/o left sided weakness, inability to walk and slurred speech. MRI head with acute infarct in the region of the posterior limb of the right internal capsule; chronic ischemic changes in both hemispheres; no acute posterior fossa lesion or acute infarct noted.

## 2018-10-22 NOTE — CONSULT NOTE ADULT - SUBJECTIVE AND OBJECTIVE BOX
Patient is a 68y old  Female who presents with a chief complaint of left sided weakness (22 Oct 2018 00:53)    HPI:  69 y/o female with PMH HTN, rheumatic fever, and ?new dx of diabetes HBa1c 7.7, who went to bed around 1 am on 10/20/18 without any complaints and when woke up in the morning noticed weakness in her whole left side upper more than lower. pt. tried to get up but could not due to weakness. pt. thought weakness will go away and did not come to the hospital till 9 : 00 pm on 10/20/18. As per pt. her speech was normal. no swallowing difficulty. Pt. did not notice any sig. facial droop but her daughter noted mild left facial droop. Denies LOC, chest pain, vision changes at the time. She did not take her BP medications for 2 days prior to her symptoms with /103 on arrival. CT head showed  possible acute mod sized infarct in the right inferior cerebellar hemisphere. MRI head found acute infarct in the region of the posterior limb of the right internal capsule. Patient undergoing cardiac workup for stroke etiology.     Imaging showed:  Noncontrast head CT scan (10/20) : Moderate sized infarct in the inferior right cerebellar hemisphere that is probably acute.      CT angiography neck (10/20): The cervical vasculature is patent without evidence of atherosclerosis.  No hemodynamically significant carotid stenosis or flow-limiting vertebral artery stenosis.  No evidence of dissection.    CT angiography brain (10/20) : No major vessel occlusion, stenosis or aneurysm about the [place] of .    MRI Head (10/21)     -acute infarct in the region of the posterior limb of the right internal capsule.  -chronic ischemic changes in both hemispheres. No acute posterior fossa lesion or acute infarct noted.    Carotid Dopplers (10/21): No significant plaque burden or hemodynamically significant stenosis.    REVIEW OF SYSTEMS  Constitutional - No fever, No weight loss, No fatigue  HEENT - No eye pain, No visual disturbances, No difficulty hearing, No tinnitus, No vertigo, No neck pain  Respiratory - No cough, No wheezing, No shortness of breath  Cardiovascular - No chest pain, No palpitations  Gastrointestinal - No abdominal pain, No nausea, No vomiting, No diarrhea, No constipation  Genitourinary - No dysuria, No frequency, No hematuria, No incontinence  Neurological - No headaches, No memory loss, No loss of strength, No numbness, No tremors  Skin - No itching, No rashes, No lesions   Endocrine - No temperature intolerance  Musculoskeletal - No joint pain, No joint swelling, No muscle pain  Psychiatric - No depression, No anxiety    VITALS  T(C): 37.2 (10-22-18 @ 08:33), Max: 37.4 (10-22-18 @ 00:07)  HR: 75 (10-22-18 @ 07:49) (75 - 103)  BP: 158/100 (10-22-18 @ 07:49) (114/93 - 185/72)  RR: 15 (10-22-18 @ 07:49) (14 - 21)  SpO2: 96% (10-22-18 @ 07:49) (96% - 100%)  Wt(kg): --    PAST MEDICAL & SURGICAL HISTORY  HTN (hypertension)  No significant past surgical history      SOCIAL HISTORY  Smoking - Denied  EtOH - Denied   Drugs - Denied    FUNCTIONAL HISTORY  Lives   Independent    CURRENT FUNCTIONAL STATUS  Pending Eval      FAMILY HISTORY   Family history of cerebrovascular accident (CVA)  No pertinent family history in first degree relatives      RECENT LABS/IMAGING  CBC Full  -  ( 22 Oct 2018 06:17 )  WBC Count : 6.2 K/uL  Hemoglobin : 12.5 g/dL  Hematocrit : 38.1 %  Platelet Count - Automated : 216 K/uL  Mean Cell Volume : 85.0 fl  Mean Cell Hemoglobin : 27.9 pg  Mean Cell Hemoglobin Concentration : 32.8 g/dL  Auto Neutrophil # : x  Auto Lymphocyte # : x  Auto Monocyte # : x  Auto Eosinophil # : x  Auto Basophil # : x  Auto Neutrophil % : x  Auto Lymphocyte % : x  Auto Monocyte % : x  Auto Eosinophil % : x  Auto Basophil % : x    10-22    138  |  102  |  12.0  ----------------------------<  151<H>  4.1   |  24.0  |  0.70    Ca    9.7      22 Oct 2018 06:17  Phos  3.8     10-22  Mg     2.0     10-22    TPro  8.4  /  Alb  4.0  /  TBili  0.7  /  DBili  x   /  AST  34<H>  /  ALT  19  /  AlkPhos  95  10-20    Lipid Profile (10.22.18 @ 06:17)    Direct LDL: 108: LDL Cholesterol --- Interpretive Comment (for adults 18 and over)  Optimal LDL Level may vary based on clinical situation  Below 70                  Ideal for people at very high risk of heart  disease  Below 100                Ideal for people at risk of heart disease  100 - 129                   [place]  130 - 159                   Borderline high  160 - 189                   High  190 and Above          Very high mg/dL        ALLERGIES  No Known Allergies      MEDICATIONS   amLODIPine   Tablet 2.5 milliGRAM(s) Oral daily  aspirin enteric coated 325 milliGRAM(s) Oral daily  atorvastatin 20 milliGRAM(s) Oral at bedtime  docusate sodium 100 milliGRAM(s) Oral three times a day  enoxaparin Injectable 40 milliGRAM(s) SubCutaneous daily  influenza   Vaccine 0.5 milliLiter(s) IntraMuscular once  metoprolol succinate ER 50 milliGRAM(s) Oral daily  ondansetron Injectable 4 milliGRAM(s) IV Push every 6 hours PRN  polyethylene glycol 3350 17 Gram(s) Oral daily  senna 2 Tablet(s) Oral at bedtime Patient is a 68y old  Female who presents with a chief complaint of left sided weakness (22 Oct 2018 00:53)    HPI:  69 y/o female with PMH HTN, rheumatic fever, and ?new dx of diabetes HBa1c 7.7, who went to bed around 1 am on 10/20/18 without any complaints and when woke up in the morning noticed weakness in her whole left side upper more than lower. pt. tried to get up but could not due to weakness. pt. thought weakness will go away and did not come to the hospital till 9 : 00 pm on 10/20/18. As per pt. her speech was normal. no swallowing difficulty. Pt. did not notice any sig. facial droop but her daughter noted mild left facial droop. Denies LOC, chest pain, vision changes at the time. She did not take her BP medications for 2 days prior to her symptoms with /103 on arrival. CT head showed  possible acute mod sized infarct in the right inferior cerebellar hemisphere. MRI head found acute infarct in the region of the posterior limb of the right internal capsule. Patient undergoing cardiac workup for stroke etiology.   Patient has no complaints at this time.    Imaging showed:  Noncontrast head CT scan (10/20) : Moderate sized infarct in the inferior right cerebellar hemisphere that is probably acute.      CT angiography neck (10/20): The cervical vasculature is patent without evidence of atherosclerosis.  No hemodynamically significant carotid stenosis or flow-limiting vertebral artery stenosis.  No evidence of dissection.    CT angiography brain (10/20) : No major vessel occlusion, stenosis or aneurysm about the [place] of .    MRI Head (10/21)     -acute infarct in the region of the posterior limb of the right internal capsule.  -chronic ischemic changes in both hemispheres. No acute posterior fossa lesion or acute infarct noted.    Carotid Dopplers (10/21): No significant plaque burden or hemodynamically significant stenosis.    REVIEW OF SYSTEMS  Constitutional - No fever, No weight loss, No fatigue  HEENT - No eye pain, No visual disturbances, No difficulty hearing, No tinnitus, No vertigo, No neck pain  Respiratory - No cough, No wheezing, No shortness of breath  Cardiovascular - No chest pain, No palpitations  Gastrointestinal - No abdominal pain, No nausea, No vomiting, No diarrhea, no constipation  Genitourinary - No dysuria, No frequency, No hematuria, No incontinence  Neurological - No headaches, No memory loss, No numbness, No tremors, admit left upper extremity weakness  Skin - No itching, No rashes, No lesions   Musculoskeletal - No joint pain, No joint swelling, No muscle pain  Psychiatric - No depression, No anxiety    VITALS  T(C): 37.2 (10-22-18 @ 08:33), Max: 37.4 (10-22-18 @ 00:07)  HR: 75 (10-22-18 @ 07:49) (75 - 103)  BP: 158/100 (10-22-18 @ 07:49) (114/93 - 185/72)  RR: 15 (10-22-18 @ 07:49) (14 - 21)  SpO2: 96% (10-22-18 @ 07:49) (96% - 100%)  Wt(kg): --    PAST MEDICAL & SURGICAL HISTORY  HTN (hypertension)  No significant past surgical history      SOCIAL HISTORY  Smoking - Denied  EtOH - Denied   Drugs - Denied    FUNCTIONAL HISTORY  Lives with her daughter and her grandchildren in and private home with 3 DARLENE with railing one side. No steps once inside the house. Bathroom with tub, no shower rail/ seat. Patient was independent with ambulation and adls including driving.    CURRENT FUNCTIONAL STATUS  Bed: min- mod assist  Transfers: Mod assist  Gait mod assist x 2 people hand held      FAMILY HISTORY   Family history of cerebrovascular accident (CVA)  No pertinent family history in first degree relatives      RECENT LABS/IMAGING  CBC Full  -  ( 22 Oct 2018 06:17 )  WBC Count : 6.2 K/uL  Hemoglobin : 12.5 g/dL  Hematocrit : 38.1 %  Platelet Count - Automated : 216 K/uL  Mean Cell Volume : 85.0 fl  Mean Cell Hemoglobin : 27.9 pg  Mean Cell Hemoglobin Concentration : 32.8 g/dL  Auto Neutrophil # : x  Auto Lymphocyte # : x  Auto Monocyte # : x  Auto Eosinophil # : x  Auto Basophil # : x  Auto Neutrophil % : x  Auto Lymphocyte % : x  Auto Monocyte % : x  Auto Eosinophil % : x  Auto Basophil % : x    10-22    138  |  102  |  12.0  ----------------------------<  151<H>  4.1   |  24.0  |  0.70    Ca    9.7      22 Oct 2018 06:17  Phos  3.8     10-22  Mg     2.0     10-22    TPro  8.4  /  Alb  4.0  /  TBili  0.7  /  DBili  x   /  AST  34<H>  /  ALT  19  /  AlkPhos  95  10-20    Lipid Profile (10.22.18 @ 06:17)    Direct LDL: 108: LDL Cholesterol --- Interpretive Comment (for adults 18 and over)  Optimal LDL Level may vary based on clinical situation  Below 70                  Ideal for people at very high risk of heart  disease  Below 100                Ideal for people at risk of heart disease  100 - 129                   [place]  130 - 159                   Borderline high  160 - 189                   High  190 and Above          Very high mg/dL        ALLERGIES  No Known Allergies      MEDICATIONS   amLODIPine   Tablet 2.5 milliGRAM(s) Oral daily  aspirin enteric coated 325 milliGRAM(s) Oral daily  atorvastatin 20 milliGRAM(s) Oral at bedtime  docusate sodium 100 milliGRAM(s) Oral three times a day  enoxaparin Injectable 40 milliGRAM(s) SubCutaneous daily  influenza   Vaccine 0.5 milliLiter(s) IntraMuscular once  metoprolol succinate ER 50 milliGRAM(s) Oral daily  ondansetron Injectable 4 milliGRAM(s) IV Push every 6 hours PRN  polyethylene glycol 3350 17 Gram(s) Oral daily  senna 2 Tablet(s) Oral at bedtime    ----------------------------------------------------------------------------------------  PHYSICAL EXAM  Constitutional - NAD, Comfortable  HEENT - NCAT, EOMI  Neck - Supple, No limited ROM  Chest - Breathing comfortably, No wheezing  Cardiovascular - S1S2   Abdomen - Soft   Extremities - No C/C/E, No calf tenderness   Neurologic Exam -                    Cognitive - Awake, Alert, AAO to self, place, date, year, situation     Communication - Fluent, No dysarthria     Cranial Nerves - CN 2-12 intact except mild left lower face droop     Motor - No focal deficits                    LEFT    UE - ShAB 2/5, EF 3/5, EE 2/5, WE 2/5,  5/5                    RIGHT UE - ShAB 4/5, EF 5/5, EE 5/5, WE 5/5,  5/5                    LEFT    LE - HF 4/5, KE 5/5, DF 5/5, PF 5/5                    RIGHT LE - HF 4/5, KE 5/5, DF 5/5, PF 5/5        Sensory - Intact to LT     Reflexes - DTR Intact except for hyperreflexive on left knee and + babinski on left     OculoVestibular - No saccades, No nystagmus, VOR      Psychiatric - Mood stable, Affect WNL Patient is a 68y old  Female who presents with a chief complaint of left sided weakness (22 Oct 2018 00:53)    HPI:  67 y/o female with PMH HTN, rheumatic fever, and ?new dx of diabetes HBa1c 7.7, who went to bed around 1 am on 10/20/18 without any complaints and when woke up in the morning noticed weakness in her whole left side upper more than lower. pt. tried to get up but could not due to weakness. pt. thought weakness will go away and did not come to the hospital till 9 : 00 pm on 10/20/18. As per pt. her speech was normal. no swallowing difficulty. Pt. did not notice any sig. facial droop but her daughter noted mild left facial droop. Denies LOC, chest pain, vision changes at the time. She did not take her BP medications for 2 days prior to her symptoms with /103 on arrival. CT head showed  possible acute mod sized infarct in the right inferior cerebellar hemisphere. MRI head found acute infarct in the region of the posterior limb of the right internal capsule. Patient undergoing cardiac workup for stroke etiology.   Patient has no complaints at this time.    Imaging showed:  Noncontrast head CT scan (10/20) : Moderate sized infarct in the inferior right cerebellar hemisphere that is probably acute.      CT angiography neck (10/20): The cervical vasculature is patent without evidence of atherosclerosis.  No hemodynamically significant carotid stenosis or flow-limiting vertebral artery stenosis.  No evidence of dissection.    CT angiography brain (10/20) : No major vessel occlusion, stenosis or aneurysm about the [place] of .    MRI Head (10/21)     -acute infarct in the region of the posterior limb of the right internal capsule.  -chronic ischemic changes in both hemispheres. No acute posterior fossa lesion or acute infarct noted.    Carotid Dopplers (10/21): No significant plaque burden or hemodynamically significant stenosis.    REVIEW OF SYSTEMS  Constitutional - No fever, No weight loss, No fatigue  HEENT - No eye pain, No visual disturbances, No difficulty hearing, No tinnitus, No vertigo, No neck pain  Respiratory - No cough, No wheezing, No shortness of breath  Cardiovascular - No chest pain, No palpitations  Gastrointestinal - No abdominal pain, No nausea, No vomiting, No diarrhea, no constipation  Genitourinary - No dysuria, No frequency, No hematuria, No incontinence  Neurological - No headaches, No memory loss, No numbness, No tremors, admit left upper extremity weakness  Skin - No itching, No rashes, No lesions   Musculoskeletal - No joint pain, No joint swelling, No muscle pain  Psychiatric - No depression, No anxiety    VITALS  T(C): 37.2 (10-22-18 @ 08:33), Max: 37.4 (10-22-18 @ 00:07)  HR: 75 (10-22-18 @ 07:49) (75 - 103)  BP: 158/100 (10-22-18 @ 07:49) (114/93 - 185/72)  RR: 15 (10-22-18 @ 07:49) (14 - 21)  SpO2: 96% (10-22-18 @ 07:49) (96% - 100%)  Wt(kg): --    PAST MEDICAL & SURGICAL HISTORY  HTN (hypertension)  No significant past surgical history    SOCIAL HISTORY  Smoking - Denied  EtOH - Denied   Drugs - Denied    FUNCTIONAL HISTORY  Lives with her daughter and her grandchildren in and private home with 3 DARLENE with railing one side. No steps once inside the house. Bathroom with tub, no shower rail/ seat. Patient was independent with ambulation and adls including driving.    CURRENT FUNCTIONAL STATUS  Bed: min- mod assist  Transfers: Mod assist  Gait mod assist x 2 people hand held    FAMILY HISTORY   Family history of cerebrovascular accident (CVA)  No pertinent family history in first degree relatives      RECENT LABS/IMAGING  CBC Full  -  ( 22 Oct 2018 06:17 )  WBC Count : 6.2 K/uL  Hemoglobin : 12.5 g/dL  Hematocrit : 38.1 %  Platelet Count - Automated : 216 K/uL  Mean Cell Volume : 85.0 fl  Mean Cell Hemoglobin : 27.9 pg  Mean Cell Hemoglobin Concentration : 32.8 g/dL  Auto Neutrophil # : x  Auto Lymphocyte # : x  Auto Monocyte # : x  Auto Eosinophil # : x  Auto Basophil # : x  Auto Neutrophil % : x  Auto Lymphocyte % : x  Auto Monocyte % : x  Auto Eosinophil % : x  Auto Basophil % : x    10-22    138  |  102  |  12.0  ----------------------------<  151<H>  4.1   |  24.0  |  0.70    Ca    9.7      22 Oct 2018 06:17  Phos  3.8     10-22  Mg     2.0     10-22    TPro  8.4  /  Alb  4.0  /  TBili  0.7  /  DBili  x   /  AST  34<H>  /  ALT  19  /  AlkPhos  95  10-20    Lipid Profile (10.22.18 @ 06:17)    Direct LDL: 108: LDL Cholesterol --- Interpretive Comment (for adults 18 and over)  Optimal LDL Level may vary based on clinical situation  Below 70                  Ideal for people at very high risk of heart  disease  Below 100                Ideal for people at risk of heart disease  100 - 129                   [place]  130 - 159                   Borderline high  160 - 189                   High  190 and Above          Very high mg/dL        ALLERGIES  No Known Allergies      MEDICATIONS   amLODIPine   Tablet 2.5 milliGRAM(s) Oral daily  aspirin enteric coated 325 milliGRAM(s) Oral daily  atorvastatin 20 milliGRAM(s) Oral at bedtime  docusate sodium 100 milliGRAM(s) Oral three times a day  enoxaparin Injectable 40 milliGRAM(s) SubCutaneous daily  influenza   Vaccine 0.5 milliLiter(s) IntraMuscular once  metoprolol succinate ER 50 milliGRAM(s) Oral daily  ondansetron Injectable 4 milliGRAM(s) IV Push every 6 hours PRN  polyethylene glycol 3350 17 Gram(s) Oral daily  senna 2 Tablet(s) Oral at bedtime    ----------------------------------------------------------------------------------------  PHYSICAL EXAM  Constitutional - NAD, Comfortable  HEENT - NCAT, EOMI  Neck - Supple, No limited ROM  Chest - Breathing comfortably, No wheezing  Cardiovascular - S1S2   Abdomen - Soft   Extremities - No C/C/E, No calf tenderness   Neurologic Exam -                    Cognitive - Awake, Alert, AAO to self, place, date, year, situation     Communication - Fluent, No dysarthria     Cranial Nerves - CN 2-12 intact except mild left lower face droop     Motor - No focal deficits                    LEFT    UE - ShAB 2/5, EF 3/5, EE 2/5, WE 2/5,  5/5                    RIGHT UE - ShAB 4/5, EF 5/5, EE 5/5, WE 5/5,  5/5                    LEFT    LE - HF 4/5, KE 5/5, DF 5/5, PF 5/5                    RIGHT LE - HF 4/5, KE 5/5, DF 5/5, PF 5/5        Sensory - Intact to LT     Reflexes - DTR Intact except for hyperreflexive on left knee and + babinski on left     OculoVestibular - No saccades, No nystagmus, VOR      Psychiatric - Mood stable, Affect WNL    ------------------------------------------------------------------------------------------------  ASSESSMENT/PLAN  68yFemale with functional deficits after Right internal capsule CVA  -Ischemic CVA: continue statin, s/s eval, c/w aspirin, consider high dose aspirin as tolerated, pending cardiac eval  -Pain - Tylenol  -HTN: blood pressure elvated on exam, started on metoprolol and amlodipine  -New diagnosis diabetes: ISS coverage, diabetic education, lifestyle modifications  -DVT PPX - SCDs, lovenox  -Rehab - once completed medical/cardiac workup/ s/s eval   Recommend ACUTE inpatient rehabilitation for the functional deficits consisting of 3 hours of therapy/day & 24 hour RN/daily PMR physician for comorbid medical management. Will continue to follow for ongoing rehab needs and recommendations. Patient will be able to tolerate 3 hours a day. HPI:  67 y/o female with PMH HTN, rheumatic fever, and ?new dx of diabetes HBa1c 7.7, who went to bed around 1 am on 10/20/18 without any complaints and when woke up in the morning noticed weakness in her whole left side upper more than lower. She tried to get up but could not due to weakness. She thought that the weakness would go away however it did not prompting her to come to the hospital on 10/20/18. As per pt. her speech was normal with no swallowing difficulty. Pt. did not notice any sig. facial droop but her daughter noted mild left facial droop. She did not take her BP medications for 2 days prior to her symptoms with /103 on arrival. CT head showed  possible acute mod sized infarct in the right inferior cerebellar hemisphere. MRI head found acute infarct in the region of the posterior limb of the right internal capsule. Patient undergoing cardiac workup for stroke etiology.     Patient has no complaints at this time and states that her symptoms of left upper extremity weakness seem to be improving    REVIEW OF SYSTEMS  Constitutional - No fever, No weight loss, No fatigue  HEENT - No eye pain, No visual disturbances, No difficulty hearing, No tinnitus, No vertigo, No neck pain  Respiratory - No cough, No wheezing, No shortness of breath  Cardiovascular - No chest pain, No palpitations  Gastrointestinal - No abdominal pain, No nausea, No vomiting, No diarrhea, no constipation  Genitourinary - No dysuria, No frequency, No hematuria, No incontinence  Neurological - No headaches, No memory loss, No numbness, No tremors, (+)left upper extremity weakness  Skin - No itching, No rashes, No lesions   Musculoskeletal - No joint pain, No joint swelling, No muscle pain  Psychiatric - No depression, No anxiety    VITALS  T(C): 37.2 (10-22-18 @ 08:33), Max: 37.4 (10-22-18 @ 00:07)  HR: 75 (10-22-18 @ 07:49) (75 - 103)  BP: 158/100 (10-22-18 @ 07:49) (114/93 - 185/72)  RR: 15 (10-22-18 @ 07:49) (14 - 21)  SpO2: 96% (10-22-18 @ 07:49) (96% - 100%)  Wt(kg): --    PAST MEDICAL & SURGICAL HISTORY  HTN (hypertension)  No significant past surgical history    FAMILY HISTORY   Family history of cerebrovascular accident - Father    ALLERGIES  No Known Allergies    SOCIAL HISTORY  Smoking - Denied  EtOH - Denied   Drugs - Denied    FUNCTIONAL HISTORY  Lives with her daughter and her grandchildren in and private home with 3 DARLENE with railing one side. No steps once inside the house. Bathroom with tub, no shower rail/ seat. Patient was independent with ambulation and adls including driving.    CURRENT FUNCTIONAL STATUS  Bed: min- mod assist  Transfers: Mod assist  Gait mod assist x 2 people hand held    PHYSICAL EXAM  Constitutional - NAD, Comfortable  HEENT - NCAT, EOMI  Neck - Supple, No limited ROM  Chest - Breathing comfortably, No wheezing  Cardiovascular - S1S2   Abdomen - Soft   Extremities - No C/C/E, No calf tenderness   Neurologic Exam -                    Cognitive - Awake, Alert, AAO to self, place, date, year, situation     Communication - Fluent, No dysarthria     Cranial Nerves - CN 2-12 intact except mild left lower face droop     Motor - No focal deficits                    LEFT    UE - ShAB 2/5, EF 3/5, EE 2/5, WE 2/5,  5/5                    RIGHT UE - ShAB 4/5, EF 5/5, EE 5/5, WE 5/5,  5/5                    LEFT    LE - HF 4/5, KE 5/5, DF 5/5, PF 5/5                    RIGHT LE - HF 4/5, KE 5/5, DF 5/5, PF 5/5        Sensory - Intact to LT     Reflexes - DTR Intact except for hyperreflexive on left knee and + babinski on left     OculoVestibular - No saccades, No nystagmus, VOR      Psychiatric - Mood stable, Affect WNL    RECENT LABS/IMAGING             12.5   6.2   )-----------( 216      ( 22 Oct 2018 06:17 )             38.1     138  |  102  |  12.0  ----------------------------<  151<H>  4.1   |  24.0  |  0.70    Ca    9.7      22 Oct 2018 06:17  Phos  3.8     10-22  Mg     2.0     10-22    TPro  8.4  /  Alb  4.0  /  TBili  0.7  /  DBili  x   /  AST  34<H>  /  ALT  19  /  AlkPhos  95  10-20    PT/INR - ( 20 Oct 2018 22:31 )   PT: 12.3 sec;   INR: 1.12 ratio    PTT - ( 20 Oct 2018 22:31 )  PTT:27.2 sec    Lipid Profile (10.22.18 @ 06:17)    Direct LDL: 108:     Imaging showed:  Noncontrast head CT scan (10/20) : Moderate sized infarct in the inferior right cerebellar hemisphere that is probably acute.      CT angiography neck (10/20): The cervical vasculature is patent without evidence of atherosclerosis.  No hemodynamically significant carotid stenosis or flow-limiting vertebral artery stenosis.  No evidence of dissection.    CT angiography brain (10/20) : No major vessel occlusion, stenosis or aneurysm about the [place] of .    MRI Head (10/21)     -acute infarct in the region of the posterior limb of the right internal capsule.  -chronic ischemic changes in both hemispheres. No acute posterior fossa lesion or acute infarct noted.    Carotid Dopplers (10/21): No significant plaque burden or hemodynamically significant stenosis.    MEDICATIONS  MEDICATIONS  (STANDING):  amLODIPine   Tablet 2.5 milliGRAM(s) Oral daily  aspirin enteric coated 325 milliGRAM(s) Oral daily  atorvastatin 20 milliGRAM(s) Oral at bedtime  docusate sodium 100 milliGRAM(s) Oral three times a day  enoxaparin Injectable 40 milliGRAM(s) SubCutaneous daily  influenza   Vaccine 0.5 milliLiter(s) IntraMuscular once  metoprolol succinate ER 50 milliGRAM(s) Oral daily  polyethylene glycol 3350 17 Gram(s) Oral daily  senna 2 Tablet(s) Oral at bedtime    MEDICATIONS  (PRN):  ondansetron Injectable 4 milliGRAM(s) IV Push every 6 hours PRN Nausea and/or Vomiting    ASSESSMENT/PLAN  68yFemale with functional deficits after right internal capsule CVA  -Ischemic CVA: continue statin, s/s eval, c/w aspirin, pending cardiac eval  -Pain - Tylenol  -HTN: blood pressure elevated on exam, started on metoprolol and amlodipine  -New diagnosis diabetes: ISS coverage, diabetic education, lifestyle modifications  -DVT PPX - SCDs, lovenox  -Rehab - once completed medical/cardiac workup/ s/s eval, recommend ACUTE inpatient rehabilitation for the functional deficits consisting of 3 hours of therapy/day & 24 hour RN/daily PMR physician for comorbid medical management. Will continue to follow for ongoing rehab needs and recommendations. Patient will be able to tolerate 3 hours a day.

## 2018-10-22 NOTE — PROGRESS NOTE ADULT - SUBJECTIVE AND OBJECTIVE BOX
INTERVAL HISTORY:  feels improved but still weak      VITAL SIGNS:  Vital Signs Last 24 Hrs  T(C): 37.2 (22 Oct 2018 08:33), Max: 37.4 (22 Oct 2018 00:07)  T(F): 99 (22 Oct 2018 08:33), Max: 99.3 (22 Oct 2018 00:07)  HR: 75 (22 Oct 2018 07:49) (75 - 103)  BP: 158/100 (22 Oct 2018 07:49) (114/93 - 185/72)  BP(mean): 117 (22 Oct 2018 07:49) (101 - 117)  RR: 15 (22 Oct 2018 07:49) (14 - 21)  SpO2: 96% (22 Oct 2018 07:49) (96% - 100%)    PHYSICAL EXAMINATION:    Mentation:  nl  Language/Speech: nl  CN: nl  Visual Fields: full  Motor: 4/5 left hemniparesis with decreased left hand dexterity and toe tap  Sensory: slight dec on left  DTR:  Babinski:      MEDS:  MEDICATIONS  (STANDING):  amLODIPine   Tablet 2.5 milliGRAM(s) Oral daily  aspirin enteric coated 325 milliGRAM(s) Oral daily  atorvastatin 20 milliGRAM(s) Oral at bedtime  docusate sodium 100 milliGRAM(s) Oral three times a day  enoxaparin Injectable 40 milliGRAM(s) SubCutaneous daily  influenza   Vaccine 0.5 milliLiter(s) IntraMuscular once  metoprolol succinate ER 50 milliGRAM(s) Oral daily  polyethylene glycol 3350 17 Gram(s) Oral daily  senna 2 Tablet(s) Oral at bedtime    MEDICATIONS  (PRN):  ondansetron Injectable 4 milliGRAM(s) IV Push every 6 hours PRN Nausea and/or Vomiting      LABS:                          12.5   6.2   )-----------( 216      ( 22 Oct 2018 06:17 )             38.1     10-22    138  |  102  |  12.0  ----------------------------<  151<H>  4.1   |  24.0  |  0.70    Ca    9.7      22 Oct 2018 06:17  Phos  3.8     10-22  Mg     2.0     10-22    TPro  8.4  /  Alb  4.0  /  TBili  0.7  /  DBili  x   /  AST  34<H>  /  ALT  19  /  AlkPhos  95  10-20    LIVER FUNCTIONS - ( 20 Oct 2018 22:31 )  Alb: 4.0 g/dL / Pro: 8.4 g/dL / ALK PHOS: 95 U/L / ALT: 19 U/L / AST: 34 U/L / GGT: x               RADIOLOGY & ADDITIONAL STUDIES:      < from: MR Head No Cont (10.21.18 @ 13:47) >  MPRESSION:      1)  acute infarct in the region of the posterior limb of the right   internal capsule..  2)  chronic ischemic changes in both hemispheres. No acute posterior   fossa lesion or acuteinfarct noted..       < end of copied text >    Carotid duplex and CTA are neg for significant stenosis    IMPRESSION & PLAN:      Hypertensive, diabetic lacunar infarct of the right internal capsule  no large vessel disease    REC:  Medical and Cardiac evaluation and treatment as indicated- TTE and ONEYDA pending  Antiplatelet therapy as prescribed.  Cerebrovascular risk factor assessment and management- DM, HTN  PT/Rehab, dispo

## 2018-10-22 NOTE — OCCUPATIONAL THERAPY INITIAL EVALUATION ADULT - RANGE OF MOTION EXAMINATION
left shoulder flexion to about 90 degrees AROM, left shoulder abduction to about 90 degrees AROM, left elbow flexion/extension AROM WFL with increased time, left gross grasp and digit extension AROM WFL

## 2018-10-23 LAB
ANION GAP SERPL CALC-SCNC: 12 MMOL/L — SIGNIFICANT CHANGE UP (ref 5–17)
BUN SERPL-MCNC: 18 MG/DL — SIGNIFICANT CHANGE UP (ref 8–20)
CALCIUM SERPL-MCNC: 9.5 MG/DL — SIGNIFICANT CHANGE UP (ref 8.6–10.2)
CHLORIDE SERPL-SCNC: 103 MMOL/L — SIGNIFICANT CHANGE UP (ref 98–107)
CO2 SERPL-SCNC: 23 MMOL/L — SIGNIFICANT CHANGE UP (ref 22–29)
CREAT SERPL-MCNC: 0.63 MG/DL — SIGNIFICANT CHANGE UP (ref 0.5–1.3)
GLUCOSE BLDC GLUCOMTR-MCNC: 101 MG/DL — HIGH (ref 70–99)
GLUCOSE BLDC GLUCOMTR-MCNC: 118 MG/DL — HIGH (ref 70–99)
GLUCOSE BLDC GLUCOMTR-MCNC: 142 MG/DL — HIGH (ref 70–99)
GLUCOSE BLDC GLUCOMTR-MCNC: 99 MG/DL — SIGNIFICANT CHANGE UP (ref 70–99)
GLUCOSE SERPL-MCNC: 115 MG/DL — SIGNIFICANT CHANGE UP (ref 70–115)
HCT VFR BLD CALC: 38.4 % — SIGNIFICANT CHANGE UP (ref 37–47)
HGB BLD-MCNC: 12.5 G/DL — SIGNIFICANT CHANGE UP (ref 12–16)
MAGNESIUM SERPL-MCNC: 2.1 MG/DL — SIGNIFICANT CHANGE UP (ref 1.8–2.6)
MCHC RBC-ENTMCNC: 27.9 PG — SIGNIFICANT CHANGE UP (ref 27–31)
MCHC RBC-ENTMCNC: 32.6 G/DL — SIGNIFICANT CHANGE UP (ref 32–36)
MCV RBC AUTO: 85.7 FL — SIGNIFICANT CHANGE UP (ref 81–99)
PHOSPHATE SERPL-MCNC: 4.3 MG/DL — SIGNIFICANT CHANGE UP (ref 2.4–4.7)
PLATELET # BLD AUTO: 200 K/UL — SIGNIFICANT CHANGE UP (ref 150–400)
POTASSIUM SERPL-MCNC: 4.3 MMOL/L — SIGNIFICANT CHANGE UP (ref 3.5–5.3)
POTASSIUM SERPL-SCNC: 4.3 MMOL/L — SIGNIFICANT CHANGE UP (ref 3.5–5.3)
RBC # BLD: 4.48 M/UL — SIGNIFICANT CHANGE UP (ref 4.4–5.2)
RBC # FLD: 14 % — SIGNIFICANT CHANGE UP (ref 11–15.6)
SODIUM SERPL-SCNC: 138 MMOL/L — SIGNIFICANT CHANGE UP (ref 135–145)
WBC # BLD: 4.7 K/UL — LOW (ref 4.8–10.8)
WBC # FLD AUTO: 4.7 K/UL — LOW (ref 4.8–10.8)

## 2018-10-23 PROCEDURE — 99233 SBSQ HOSP IP/OBS HIGH 50: CPT

## 2018-10-23 PROCEDURE — 99222 1ST HOSP IP/OBS MODERATE 55: CPT | Mod: GC

## 2018-10-23 RX ORDER — NYSTATIN CREAM 100000 [USP'U]/G
1 CREAM TOPICAL
Qty: 0 | Refills: 0 | Status: DISCONTINUED | OUTPATIENT
Start: 2018-10-23 | End: 2018-10-24

## 2018-10-23 RX ORDER — AMLODIPINE BESYLATE 2.5 MG/1
1 TABLET ORAL
Qty: 0 | Refills: 0 | COMMUNITY
Start: 2018-10-23

## 2018-10-23 RX ORDER — DEXTROSE 50 % IN WATER 50 %
12.5 SYRINGE (ML) INTRAVENOUS ONCE
Qty: 0 | Refills: 0 | Status: DISCONTINUED | OUTPATIENT
Start: 2018-10-23 | End: 2018-10-24

## 2018-10-23 RX ORDER — NYSTATIN CREAM 100000 [USP'U]/G
1 CREAM TOPICAL
Qty: 0 | Refills: 0 | COMMUNITY
Start: 2018-10-23

## 2018-10-23 RX ORDER — SODIUM CHLORIDE 9 MG/ML
1000 INJECTION, SOLUTION INTRAVENOUS
Qty: 0 | Refills: 0 | Status: DISCONTINUED | OUTPATIENT
Start: 2018-10-23 | End: 2018-10-24

## 2018-10-23 RX ORDER — METFORMIN HYDROCHLORIDE 850 MG/1
1 TABLET ORAL
Qty: 0 | Refills: 0 | COMMUNITY

## 2018-10-23 RX ORDER — AMLODIPINE BESYLATE 2.5 MG/1
5 TABLET ORAL DAILY
Qty: 0 | Refills: 0 | Status: DISCONTINUED | OUTPATIENT
Start: 2018-10-23 | End: 2018-10-24

## 2018-10-23 RX ORDER — ASPIRIN/CALCIUM CARB/MAGNESIUM 324 MG
1 TABLET ORAL
Qty: 0 | Refills: 0 | COMMUNITY
Start: 2018-10-23

## 2018-10-23 RX ORDER — DEXTROSE 50 % IN WATER 50 %
25 SYRINGE (ML) INTRAVENOUS ONCE
Qty: 0 | Refills: 0 | Status: DISCONTINUED | OUTPATIENT
Start: 2018-10-23 | End: 2018-10-24

## 2018-10-23 RX ORDER — BENZOCAINE AND MENTHOL 5; 1 G/100ML; G/100ML
2 LIQUID ORAL EVERY 4 HOURS
Qty: 0 | Refills: 0 | Status: DISCONTINUED | OUTPATIENT
Start: 2018-10-23 | End: 2018-10-24

## 2018-10-23 RX ORDER — INSULIN LISPRO 100/ML
VIAL (ML) SUBCUTANEOUS
Qty: 0 | Refills: 0 | Status: DISCONTINUED | OUTPATIENT
Start: 2018-10-23 | End: 2018-10-24

## 2018-10-23 RX ORDER — SENNA PLUS 8.6 MG/1
2 TABLET ORAL
Qty: 0 | Refills: 0 | COMMUNITY
Start: 2018-10-23

## 2018-10-23 RX ORDER — INSULIN LISPRO 100/ML
VIAL (ML) SUBCUTANEOUS AT BEDTIME
Qty: 0 | Refills: 0 | Status: DISCONTINUED | OUTPATIENT
Start: 2018-10-23 | End: 2018-10-24

## 2018-10-23 RX ORDER — POLYETHYLENE GLYCOL 3350 17 G/17G
17 POWDER, FOR SOLUTION ORAL
Qty: 0 | Refills: 0 | COMMUNITY
Start: 2018-10-23

## 2018-10-23 RX ORDER — ATORVASTATIN CALCIUM 80 MG/1
1 TABLET, FILM COATED ORAL
Qty: 0 | Refills: 0 | COMMUNITY
Start: 2018-10-23

## 2018-10-23 RX ORDER — DEXTROSE 50 % IN WATER 50 %
15 SYRINGE (ML) INTRAVENOUS ONCE
Qty: 0 | Refills: 0 | Status: DISCONTINUED | OUTPATIENT
Start: 2018-10-23 | End: 2018-10-24

## 2018-10-23 RX ORDER — METOPROLOL TARTRATE 50 MG
1 TABLET ORAL
Qty: 0 | Refills: 0 | COMMUNITY

## 2018-10-23 RX ORDER — METOPROLOL TARTRATE 50 MG
1 TABLET ORAL
Qty: 0 | Refills: 0 | COMMUNITY
Start: 2018-10-23

## 2018-10-23 RX ORDER — DOCUSATE SODIUM 100 MG
1 CAPSULE ORAL
Qty: 0 | Refills: 0 | COMMUNITY
Start: 2018-10-23

## 2018-10-23 RX ORDER — GLUCAGON INJECTION, SOLUTION 0.5 MG/.1ML
1 INJECTION, SOLUTION SUBCUTANEOUS ONCE
Qty: 0 | Refills: 0 | Status: DISCONTINUED | OUTPATIENT
Start: 2018-10-23 | End: 2018-10-24

## 2018-10-23 RX ORDER — AMLODIPINE BESYLATE 2.5 MG/1
2.5 TABLET ORAL ONCE
Qty: 0 | Refills: 0 | Status: COMPLETED | OUTPATIENT
Start: 2018-10-23 | End: 2018-10-23

## 2018-10-23 RX ADMIN — NYSTATIN CREAM 1 APPLICATION(S): 100000 CREAM TOPICAL at 16:03

## 2018-10-23 RX ADMIN — Medication 100 MILLIGRAM(S): at 11:49

## 2018-10-23 RX ADMIN — ENOXAPARIN SODIUM 40 MILLIGRAM(S): 100 INJECTION SUBCUTANEOUS at 11:49

## 2018-10-23 RX ADMIN — NYSTATIN CREAM 1 APPLICATION(S): 100000 CREAM TOPICAL at 05:31

## 2018-10-23 RX ADMIN — Medication 325 MILLIGRAM(S): at 11:49

## 2018-10-23 RX ADMIN — Medication 50 MILLIGRAM(S): at 05:31

## 2018-10-23 RX ADMIN — POLYETHYLENE GLYCOL 3350 17 GRAM(S): 17 POWDER, FOR SOLUTION ORAL at 11:49

## 2018-10-23 RX ADMIN — ATORVASTATIN CALCIUM 20 MILLIGRAM(S): 80 TABLET, FILM COATED ORAL at 21:17

## 2018-10-23 RX ADMIN — AMLODIPINE BESYLATE 2.5 MILLIGRAM(S): 2.5 TABLET ORAL at 17:20

## 2018-10-23 RX ADMIN — BENZOCAINE AND MENTHOL 2 LOZENGE: 5; 1 LIQUID ORAL at 16:09

## 2018-10-23 RX ADMIN — AMLODIPINE BESYLATE 2.5 MILLIGRAM(S): 2.5 TABLET ORAL at 05:31

## 2018-10-23 RX ADMIN — Medication 100 MILLIGRAM(S): at 05:31

## 2018-10-23 NOTE — PROGRESS NOTE ADULT - ASSESSMENT
68 year old female htn, acute cva Right Internal Capsule with left internal capsule stroke, constipation. 10/22 Patient had ONEYDA which was within within normal limits.  Patient may be discharged to rehab. 68 year old female htn, acute cva Right Internal Capsule stroke, with left sided weakness, constipation. 10/22 Patient had ONEYDA which was within within normal limits. Patient may be discharged to rehab. She has made tremendous progress and continues to improve day by day.

## 2018-10-23 NOTE — PROGRESS NOTE ADULT - PROBLEM SELECTOR PLAN 1
Patient with left sided weakness. PT eval suggests rehab.  Aspirin and statin. Right Internal Capsule infarct.
Patient with left sided weakness. PT eval, will likely need rehab.  Aspirin and statin. Right Internal Capsule infarct.
Patient with left sided weakness. PT eval, will sam need rehab.  Aspirin and statin

## 2018-10-23 NOTE — PROGRESS NOTE ADULT - PROBLEM SELECTOR PLAN 2
BP improved with amlodipine and metoprolol BP minimally elevated with amlodipine and metoprolol.  Increase amlodipine from 2.5 to 5 mg daily and monitor bp closely.

## 2018-10-23 NOTE — SWALLOW BEDSIDE ASSESSMENT ADULT - COMMENTS
68 year old female htn, acute cva Right Internal Capsule with left internal capsule stroke, constipation. 10/22 Patient had ONEYDA which was within within normal limits.

## 2018-10-23 NOTE — PROGRESS NOTE ADULT - SUBJECTIVE AND OBJECTIVE BOX
No interval changes  Chart reviewed    ONEYDA neg    IMP:  Lacunar infarct  Discarhge and management plans as outlined  Recontact as needed

## 2018-10-23 NOTE — SWALLOW BEDSIDE ASSESSMENT ADULT - SWALLOW EVAL: DIAGNOSIS
Deidre & pharyngeal stage of swallow clinically judged to be WFL with no overt s/s of penetration/aspiration

## 2018-10-23 NOTE — SWALLOW BEDSIDE ASSESSMENT ADULT - ASR SWALLOW ASPIRATION MONITOR
oral hygiene/position upright (90Y)/change of breathing pattern/cough/throat clearing/gurgly voice/pneumonia/upper respiratory infection/fever

## 2018-10-23 NOTE — PROGRESS NOTE ADULT - PROBLEM SELECTOR PLAN 3
Monitor glucose, continue scale coverage  A1C 7.7
Monitor glucose, continue scale coverage  A1C 7.7  Add metformin as an outpatient.
Monitor glucose  continue scale coverage

## 2018-10-23 NOTE — PROGRESS NOTE ADULT - SUBJECTIVE AND OBJECTIVE BOX
TN met with pt and sister Emily prior to d/c   Future Appointments  Date Time Provider Department Center   3/26/2018 1:00 PM Cam Ashton MD [facility] TUMOR [facility]   7/9/2018 9:15 AM [facility] MRI1 [facility] MRI [facility]   7/9/2018 11:00 AM [facility] CT1 LIMIT 400 LBS [facility] CT SCAN [facility]   7/9/2018 12:45 PM CARDIOLOGY, ECHO [facility] CARD [facility]   7/10/2018 12:30 PM Serjio Winkler MD [facility] TUMOR [facility]     sister Emily spoke with D office -- pt to f/u with Dr. Nicole -- will be seen by Dr. Novak -- office to contact pt with apt.   In addition, pt's tube feeding to resume in 2 wks.    HH orders sent to [facility] and [facility] per [facility]   Cuauhtemoc with [facility] and Ivana with [facility] aware that pt is discharging today.      [place] [facility] [place] -- she is aware that pt will be d/c'd today and needs to resume HH.          03/13/18 [id number]   Final Note   Assessment Type Final Discharge Note   Discharge Disposition Home-Health   What phone number can be called within the next 1-3 days to see how you are doing after discharge? [phone]   Hospital Follow Up  Appt(s) scheduled? Yes   Discharge plans and expectations educations in teach back method with documentation complete? Yes   [facility] Referral Info   Post Acute Recommendation No Care   Referral Type home Care    Facility Name [facility]   )      [facility]     Chief Complaint: Patient is a 68y old  Female who presents with a chief complaint of left sided weakness (22 Oct 2018 14:44)      PAST MEDICAL & SURGICAL HISTORY:  HTN (hypertension)  No significant past surgical history      HPI/OVERNIGHT EVENTS: Patient lying in bed, some sore throat after ONEYDA. She has left side weakness arms greater than legs.    MEDICATIONS  (STANDING):  amLODIPine   Tablet 2.5 milliGRAM(s) Oral daily  aspirin enteric coated 325 milliGRAM(s) Oral daily  atorvastatin 20 milliGRAM(s) Oral at bedtime  docusate sodium 100 milliGRAM(s) Oral three times a day  enoxaparin Injectable 40 milliGRAM(s) SubCutaneous daily  influenza   Vaccine 0.5 milliLiter(s) IntraMuscular once  metoprolol succinate ER 50 milliGRAM(s) Oral daily  nystatin Powder 1 Application(s) Topical two times a day  polyethylene glycol 3350 17 Gram(s) Oral daily  senna 2 Tablet(s) Oral at bedtime      Vital Signs Last 24 Hrs  T(C): 36.8 (23 Oct 2018 08:30), Max: 37.2 (22 Oct 2018 13:58)  T(F): 98.2 (23 Oct 2018 08:30), Max: 98.9 (22 Oct 2018 13:58)  HR: 78 (23 Oct 2018 05:30) (63 - 90)  BP: 150/71 (23 Oct 2018 05:30) (118/98 - 171/62)  BP(mean): 94 (23 Oct 2018 05:30) (85 - 105)  RR: 15 (23 Oct 2018 04:00) (12 - 20)  SpO2: 100% (23 Oct 2018 04:00) (96% - 100%)    PHYSICAL EXAM:  Constitutional: frail elderly female  HEENT: PERRLA, EOMI, Normal Hearing, MMM  Neck: No LAD, No JVD  Back: Normal spine flexure, No CVA tenderness  Respiratory: CTAB Cardiovascular: S1 and S2, RRR, no M/G/R  Gastrointestinal: BS+, soft, NT/ND  Extremities: No peripheral edema  Vascular: 2+ peripheral pulses  Neurological: A/O x 3,  left side weakness arm greater than leg    CAPILLARY BLOOD GLUCOSE    LABS:                        12.5   4.7   )-----------( 200      ( 23 Oct 2018 06:18 )             38.4     10-23    138  |  103  |  18.0  ----------------------------<  115  4.3   |  23.0  |  0.63    Ca    9.5      23 Oct 2018 06:18  Phos  4.3     10-23  Mg     2.1     10-23            RADIOLOG[facility] & ADDITIONAL TESTS: [facility]     Chief Complaint: Patient is a 68 year old  Female who presents with a chief complaint of left sided weakness (22 Oct 2018 14:44)      PAST MEDICAL & SURGICAL HISTORY:  HTN (hypertension)  No significant past surgical history      HPI/OVERNIGHT EVENTS: Patient lying in bed, some sore throat after ONEYDA. She has left side weakness arms greater than legs.    MEDICATIONS  (STANDING):  amLODIPine   Tablet 2.5 milliGRAM(s) Oral daily  aspirin enteric coated 325 milliGRAM(s) Oral daily  atorvastatin 20 milliGRAM(s) Oral at bedtime  docusate sodium 100 milliGRAM(s) Oral three times a day  enoxaparin Injectable 40 milliGRAM(s) SubCutaneous daily  influenza   Vaccine 0.5 milliLiter(s) IntraMuscular once  metoprolol succinate ER 50 milliGRAM(s) Oral daily  nystatin Powder 1 Application(s) Topical two times a day  polyethylene glycol 3350 17 Gram(s) Oral daily  senna 2 Tablet(s) Oral at bedtime      Vital Signs Last 24 Hrs  T(C): 36.8 (23 Oct 2018 08:30), Max: 37.2 (22 Oct 2018 13:58)  T(F): 98.2 (23 Oct 2018 08:30), Max: 98.9 (22 Oct 2018 13:58)  HR: 78 (23 Oct 2018 05:30) (63 - 90)  BP: 150/71 (23 Oct 2018 05:30) (118/98 - 171/62)  BP(mean): 94 (23 Oct 2018 05:30) (85 - 105)  RR: 15 (23 Oct 2018 04:00) (12 - 20)  SpO2: 100% (23 Oct 2018 04:00) (96% - 100%)    PHYSICAL EXAM:  Constitutional: frail elderly female  HEENT: PERRLA, EOMI, Normal Hearing, MMM  Neck: No LAD, No JVD  Back: Normal spine flexure, No CVA tenderness  Respiratory: CTAB Cardiovascular: S1 and S2, RRR, no M/G/R  Gastrointestinal: BS+, soft, NT/ND  Extremities: No peripheral edema  Vascular: 2+ peripheral pulses  Neurological: A/O x 3,  left side weakness arm greater than leg    CAPILLARY BLOOD GLUCOSE    LABS:                        12.5   4.7   )-----------( 200      ( 23 Oct 2018 06:18 )             38.4     10-23    138  |  103  |  18.0  ----------------------------<  115  4.3   |  23.0  |  0.63    Ca    9.5      23 Oct 2018 06:18  Phos  4.3     10-23  Mg     2.1     10-23            RADIOLOGY & ADDITIONAL TESTS:

## 2018-10-23 NOTE — SWALLOW BEDSIDE ASSESSMENT ADULT - SLP GENERAL OBSERVATIONS
Pt received & seen seated upright in bed, +awake/alert, +oriented, +c/o sore throat (since ONEYDA 10/22)

## 2018-10-24 VITALS
RESPIRATION RATE: 18 BRPM | HEART RATE: 77 BPM | DIASTOLIC BLOOD PRESSURE: 74 MMHG | SYSTOLIC BLOOD PRESSURE: 142 MMHG | OXYGEN SATURATION: 98 % | TEMPERATURE: 98 F

## 2018-10-24 LAB
GLUCOSE BLDC GLUCOMTR-MCNC: 123 MG/DL — HIGH (ref 70–99)
GLUCOSE BLDC GLUCOMTR-MCNC: 137 MG/DL — HIGH (ref 70–99)
GLUCOSE BLDC GLUCOMTR-MCNC: 164 MG/DL — HIGH (ref 70–99)

## 2018-10-24 PROCEDURE — 99239 HOSP IP/OBS DSCHRG MGMT >30: CPT

## 2018-10-24 RX ADMIN — Medication 2: at 11:47

## 2018-10-24 RX ADMIN — POLYETHYLENE GLYCOL 3350 17 GRAM(S): 17 POWDER, FOR SOLUTION ORAL at 11:47

## 2018-10-24 RX ADMIN — Medication 50 MILLIGRAM(S): at 06:12

## 2018-10-24 RX ADMIN — Medication 100 MILLIGRAM(S): at 06:12

## 2018-10-24 RX ADMIN — ENOXAPARIN SODIUM 40 MILLIGRAM(S): 100 INJECTION SUBCUTANEOUS at 11:48

## 2018-10-24 RX ADMIN — NYSTATIN CREAM 1 APPLICATION(S): 100000 CREAM TOPICAL at 06:12

## 2018-10-24 RX ADMIN — Medication 325 MILLIGRAM(S): at 11:47

## 2018-10-24 RX ADMIN — BENZOCAINE AND MENTHOL 2 LOZENGE: 5; 1 LIQUID ORAL at 01:54

## 2018-10-24 RX ADMIN — NYSTATIN CREAM 1 APPLICATION(S): 100000 CREAM TOPICAL at 16:52

## 2018-10-24 RX ADMIN — AMLODIPINE BESYLATE 5 MILLIGRAM(S): 2.5 TABLET ORAL at 06:12

## 2018-10-24 RX ADMIN — Medication 100 MILLIGRAM(S): at 11:51

## 2018-11-11 PROCEDURE — 93312 ECHO TRANSESOPHAGEAL: CPT

## 2018-11-11 PROCEDURE — 85730 THROMBOPLASTIN TIME PARTIAL: CPT

## 2018-11-11 PROCEDURE — 70498 CT ANGIOGRAPHY NECK: CPT

## 2018-11-11 PROCEDURE — 83735 ASSAY OF MAGNESIUM: CPT

## 2018-11-11 PROCEDURE — 83036 HEMOGLOBIN GLYCOSYLATED A1C: CPT

## 2018-11-11 PROCEDURE — 99285 EMERGENCY DEPT VISIT HI MDM: CPT | Mod: 25

## 2018-11-11 PROCEDURE — 93325 DOPPLER ECHO COLOR FLOW MAPG: CPT

## 2018-11-11 PROCEDURE — 97163 PT EVAL HIGH COMPLEX 45 MIN: CPT

## 2018-11-11 PROCEDURE — 96360 HYDRATION IV INFUSION INIT: CPT

## 2018-11-11 PROCEDURE — 80061 LIPID PANEL: CPT

## 2018-11-11 PROCEDURE — 71045 X-RAY EXAM CHEST 1 VIEW: CPT

## 2018-11-11 PROCEDURE — 85027 COMPLETE CBC AUTOMATED: CPT

## 2018-11-11 PROCEDURE — 84100 ASSAY OF PHOSPHORUS: CPT

## 2018-11-11 PROCEDURE — 97167 OT EVAL HIGH COMPLEX 60 MIN: CPT

## 2018-11-11 PROCEDURE — 36415 COLL VENOUS BLD VENIPUNCTURE: CPT

## 2018-11-11 PROCEDURE — 70551 MRI BRAIN STEM W/O DYE: CPT

## 2018-11-11 PROCEDURE — 70496 CT ANGIOGRAPHY HEAD: CPT

## 2018-11-11 PROCEDURE — 96361 HYDRATE IV INFUSION ADD-ON: CPT

## 2018-11-11 PROCEDURE — 93005 ELECTROCARDIOGRAM TRACING: CPT

## 2018-11-11 PROCEDURE — 80048 BASIC METABOLIC PNL TOTAL CA: CPT

## 2018-11-11 PROCEDURE — 80053 COMPREHEN METABOLIC PANEL: CPT

## 2018-11-11 PROCEDURE — 93320 DOPPLER ECHO COMPLETE: CPT

## 2018-11-11 PROCEDURE — 84484 ASSAY OF TROPONIN QUANT: CPT

## 2018-11-11 PROCEDURE — 84443 ASSAY THYROID STIM HORMONE: CPT

## 2018-11-11 PROCEDURE — 82962 GLUCOSE BLOOD TEST: CPT

## 2018-11-11 PROCEDURE — 93880 EXTRACRANIAL BILAT STUDY: CPT

## 2018-11-11 PROCEDURE — 92610 EVALUATE SWALLOWING FUNCTION: CPT

## 2018-11-11 PROCEDURE — 85610 PROTHROMBIN TIME: CPT

## 2018-11-11 PROCEDURE — 93306 TTE W/DOPPLER COMPLETE: CPT

## 2019-04-24 NOTE — PHYSICAL THERAPY INITIAL EVALUATION ADULT - BALANCE DISTURBANCE, IDENTIFIED IMPAIRMENT CONTRIBUTE, REHAB EVAL
decreased ROM/abnormal muscle tone/decreased strength

## 2019-07-02 NOTE — PHYSICAL THERAPY INITIAL EVALUATION ADULT - REFERRAL TO ANOTHER SERVICE NEEDED, PT EVAL
occupational therapy/speech language pathology Cr downtrending from 3.25 on admission now to 2.41, hx of CKD IV  Had ATN prior admission and briefly required HD, discharged on 6/26 with Cr of 2.27. No indication for RRT at this time  Continue IVF, Home Tor[facility] on hold  Renal following.

## 2021-01-01 ENCOUNTER — EMERGENCY (EMERGENCY)
Facility: HOSPITAL | Age: 71
LOS: 1 days | Discharge: HOME | End: 2021-01-01
Attending: EMERGENCY MEDICINE | Admitting: EMERGENCY MEDICINE
Payer: MEDICARE

## 2021-01-01 VITALS
HEIGHT: 63 IN | SYSTOLIC BLOOD PRESSURE: 182 MMHG | HEART RATE: 88 BPM | OXYGEN SATURATION: 98 % | DIASTOLIC BLOOD PRESSURE: 75 MMHG | RESPIRATION RATE: 22 BRPM | TEMPERATURE: 98 F | WEIGHT: 179.9 LBS

## 2021-01-01 LAB
ALBUMIN SERPL ELPH-MCNC: 3.9 G/DL — SIGNIFICANT CHANGE UP (ref 3.3–5.2)
ALP SERPL-CCNC: 77 U/L — SIGNIFICANT CHANGE UP (ref 40–120)
ALT FLD-CCNC: 13 U/L — SIGNIFICANT CHANGE UP
ANION GAP SERPL CALC-SCNC: 12 MMOL/L — SIGNIFICANT CHANGE UP (ref 5–17)
APTT BLD: 29.7 SEC — SIGNIFICANT CHANGE UP (ref 27.5–35.5)
AST SERPL-CCNC: 21 U/L — SIGNIFICANT CHANGE UP
BASOPHILS # BLD AUTO: 0.03 K/UL — SIGNIFICANT CHANGE UP (ref 0–0.2)
BASOPHILS NFR BLD AUTO: 0.3 % — SIGNIFICANT CHANGE UP (ref 0–2)
BILIRUB SERPL-MCNC: 0.6 MG/DL — SIGNIFICANT CHANGE UP (ref 0.4–2)
BLD GP AB SCN SERPL QL: SIGNIFICANT CHANGE UP
BUN SERPL-MCNC: 14 MG/DL — SIGNIFICANT CHANGE UP (ref 8–20)
CALCIUM SERPL-MCNC: 9.9 MG/DL — SIGNIFICANT CHANGE UP (ref 8.6–10.2)
CHLORIDE SERPL-SCNC: 103 MMOL/L — SIGNIFICANT CHANGE UP (ref 98–107)
CO2 SERPL-SCNC: 22 MMOL/L — SIGNIFICANT CHANGE UP (ref 22–29)
CREAT SERPL-MCNC: 0.71 MG/DL — SIGNIFICANT CHANGE UP (ref 0.5–1.3)
EOSINOPHIL # BLD AUTO: 0.01 K/UL — SIGNIFICANT CHANGE UP (ref 0–0.5)
EOSINOPHIL NFR BLD AUTO: 0.1 % — SIGNIFICANT CHANGE UP (ref 0–6)
GLUCOSE SERPL-MCNC: 139 MG/DL — HIGH (ref 70–99)
HCT VFR BLD CALC: 38.9 % — SIGNIFICANT CHANGE UP (ref 34.5–45)
HGB BLD-MCNC: 12.6 G/DL — SIGNIFICANT CHANGE UP (ref 11.5–15.5)
IMM GRANULOCYTES NFR BLD AUTO: 0.2 % — SIGNIFICANT CHANGE UP (ref 0–1.5)
INR BLD: 1.16 RATIO — SIGNIFICANT CHANGE UP (ref 0.88–1.16)
LYMPHOCYTES # BLD AUTO: 0.62 K/UL — LOW (ref 1–3.3)
LYMPHOCYTES # BLD AUTO: 6.4 % — LOW (ref 13–44)
MCHC RBC-ENTMCNC: 29.2 PG — SIGNIFICANT CHANGE UP (ref 27–34)
MCHC RBC-ENTMCNC: 32.4 GM/DL — SIGNIFICANT CHANGE UP (ref 32–36)
MCV RBC AUTO: 90.3 FL — SIGNIFICANT CHANGE UP (ref 80–100)
MONOCYTES # BLD AUTO: 0.37 K/UL — SIGNIFICANT CHANGE UP (ref 0–0.9)
MONOCYTES NFR BLD AUTO: 3.8 % — SIGNIFICANT CHANGE UP (ref 2–14)
NEUTROPHILS # BLD AUTO: 8.59 K/UL — HIGH (ref 1.8–7.4)
NEUTROPHILS NFR BLD AUTO: 89.2 % — HIGH (ref 43–77)
PLATELET # BLD AUTO: 207 K/UL — SIGNIFICANT CHANGE UP (ref 150–400)
POTASSIUM SERPL-MCNC: 3.9 MMOL/L — SIGNIFICANT CHANGE UP (ref 3.5–5.3)
POTASSIUM SERPL-SCNC: 3.9 MMOL/L — SIGNIFICANT CHANGE UP (ref 3.5–5.3)
PROT SERPL-MCNC: 7.4 G/DL — SIGNIFICANT CHANGE UP (ref 6.6–8.7)
PROTHROM AB SERPL-ACNC: 13.4 SEC — SIGNIFICANT CHANGE UP (ref 10.6–13.6)
RBC # BLD: 4.31 M/UL — SIGNIFICANT CHANGE UP (ref 3.8–5.2)
RBC # FLD: 13.8 % — SIGNIFICANT CHANGE UP (ref 10.3–14.5)
SODIUM SERPL-SCNC: 137 MMOL/L — SIGNIFICANT CHANGE UP (ref 135–145)
TROPONIN T SERPL-MCNC: <0.01 NG/ML — SIGNIFICANT CHANGE UP (ref 0–0.06)
WBC # BLD: 9.64 K/UL — SIGNIFICANT CHANGE UP (ref 3.8–10.5)
WBC # FLD AUTO: 9.64 K/UL — SIGNIFICANT CHANGE UP (ref 3.8–10.5)

## 2021-01-01 PROCEDURE — 71045 X-RAY EXAM CHEST 1 VIEW: CPT | Mod: 26

## 2021-01-01 PROCEDURE — 93010 ELECTROCARDIOGRAM REPORT: CPT

## 2021-01-01 PROCEDURE — 99220: CPT

## 2021-01-01 RX ORDER — METOPROLOL TARTRATE 50 MG
50 TABLET ORAL DAILY
Refills: 0 | Status: DISCONTINUED | OUTPATIENT
Start: 2021-01-01 | End: 2021-01-06

## 2021-01-01 RX ORDER — SODIUM CHLORIDE 9 MG/ML
1000 INJECTION, SOLUTION INTRAVENOUS
Refills: 0 | Status: DISCONTINUED | OUTPATIENT
Start: 2021-01-01 | End: 2021-01-06

## 2021-01-01 RX ORDER — DEXTROSE 50 % IN WATER 50 %
12.5 SYRINGE (ML) INTRAVENOUS ONCE
Refills: 0 | Status: DISCONTINUED | OUTPATIENT
Start: 2021-01-01 | End: 2021-01-06

## 2021-01-01 RX ORDER — ASPIRIN/CALCIUM CARB/MAGNESIUM 324 MG
325 TABLET ORAL ONCE
Refills: 0 | Status: COMPLETED | OUTPATIENT
Start: 2021-01-01 | End: 2021-01-01

## 2021-01-01 RX ORDER — AMLODIPINE BESYLATE 2.5 MG/1
5 TABLET ORAL DAILY
Refills: 0 | Status: DISCONTINUED | OUTPATIENT
Start: 2021-01-01 | End: 2021-01-06

## 2021-01-01 RX ORDER — GLUCAGON INJECTION, SOLUTION 0.5 MG/.1ML
1 INJECTION, SOLUTION SUBCUTANEOUS ONCE
Refills: 0 | Status: DISCONTINUED | OUTPATIENT
Start: 2021-01-01 | End: 2021-01-06

## 2021-01-01 RX ORDER — METFORMIN HYDROCHLORIDE 850 MG/1
1000 TABLET ORAL DAILY
Refills: 0 | Status: DISCONTINUED | OUTPATIENT
Start: 2021-01-01 | End: 2021-01-01

## 2021-01-01 RX ORDER — DEXTROSE 50 % IN WATER 50 %
15 SYRINGE (ML) INTRAVENOUS ONCE
Refills: 0 | Status: DISCONTINUED | OUTPATIENT
Start: 2021-01-01 | End: 2021-01-06

## 2021-01-01 RX ORDER — DEXTROSE 50 % IN WATER 50 %
25 SYRINGE (ML) INTRAVENOUS ONCE
Refills: 0 | Status: DISCONTINUED | OUTPATIENT
Start: 2021-01-01 | End: 2021-01-06

## 2021-01-01 RX ADMIN — Medication 325 MILLIGRAM(S): at 21:35

## 2021-01-01 NOTE — ED ADULT NURSE NOTE - OBJECTIVE STATEMENT
pt BIB [facility] mobile stroke unit, per EMS pt with slurred speech beginning at 3pm noticed by family.  pmhx of stroke. EMS called at 1930 to pt house, slurred speech noted on scene per EMS. CT scan of head completed en route to ED. slurred speech resolved by time of arrival to ED, code stroke activated. NIHSS=0 upon arrival. MD Mejia at bedside. pt only c/o dizziness. pt educated on POC, verbalized understanding. pt safety measures maintained.

## 2021-01-01 NOTE — ED PROVIDER NOTE - NS ED ROS FT
Review of Systems  CONSTITUTIONAL - no  fever, no diaphoresis, no weight change  SKIN - no rash  HEMATOLOGIC - no bleeding, no bruising  EYES - no eye pain, no blurred vision  ENT - no change in hearing, no pain  RESPIRATORY - no shortness of breath, no cough  CARDIAC - no chest pain, no palpitations  GI - no abd pain, no nausea, no vomiting, no diarrhea, no constipation, no bleeding  GENITO-URINARY - no discharge, no dysuria; no hematuria,   ENDO - no polydypsia, no polyurea, no heat/no cold intolerance  MUSCULOSKELETAL - no joint pain, no swelling, no redness  NEUROLOGIC - no weakness, no headache, no anesthesia, no paresthesias  PSYCH - no anxiety, non suicidal, non homicidal, no hallucination, no depression Review of Systems  CONSTITUTIONAL - no  fever, no diaphoresis, no weight change  SKIN - no rash  HEMATOLOGIC - no bleeding, no bruising  EYES - no eye pain, no blurred vision  ENT - no change in hearing, no pain  RESPIRATORY - no shortness of breath, no cough  CARDIAC - no chest pain, no palpitations  GI - no abd pain, no nausea, no vomiting, no diarrhea, no constipation, no bleeding  GENITO-URINARY - no discharge, no dysuria; no hematuria,   ENDO - no polydypsia, no polyurea, no heat/no cold intolerance  MUSCULOSKELETAL - no joint pain, no swelling, no redness  NEUROLOGIC - no weakness, no headache, no anesthesia, no paresthesias (+) slurred speech (+) dizziness.   PSYCH - no anxiety, non suicidal, non homicidal, no hallucination, no depression

## 2021-01-01 NOTE — ED ADULT NURSE NOTE - NSIMPLEMENTINTERV_GEN_ALL_ED
Implemented All Fall Risk Interventions:  [place] to call system. Call bell, personal items and telephone within reach. Instruct patient to call for assistance. Room bathroom lighting operational. Non-slip footwear when patient is off stretcher. Physically safe environment: no spills, clutter or unnecessary equipment. Stretcher in lowest position, wheels locked, appropriate side rails in place. Provide visual cue, wrist band, yellow gown, etc. Monitor gait and stability. Monitor for mental status changes and reorient to person, place, and time. Review medications for side effects contributing to fall risk. Reinforce activity limits and safety measures with patient and family.

## 2021-01-01 NOTE — ED PROVIDER NOTE - CLINICAL SUMMARY MEDICAL DECISION MAKING FREE TEXT BOX
Patient with prior hx of stroke, reported slurred speech. Last known normal was at 3pm today. Currently improved. CT of head from [facility] mobile unremarkable. TPA not given. Awaiting CTA results. Will obtain blood work, EKG, will need MRI to r/o stroke.

## 2021-01-01 NOTE — ED PROVIDER NOTE - PROGRESS NOTE DETAILS
blood work unremarkable. no acute distress. will place in Obs for MRI. If no CTA result, will get MRA of head and neck.

## 2021-01-01 NOTE — ED ADULT TRIAGE NOTE - CHIEF COMPLAINT QUOTE
C/o slurred speech. Pt brought in by [facility] Stroke Unit. Last known well 1500. Hx of HTN DMT2. MD Mejia at bedside. Code Stroke activated.

## 2021-01-01 NOTE — ED PROVIDER NOTE - OBJECTIVE STATEMENT
71 y/o female with PMHx of stroke, HTN, HLD, Diabetes, presents to the ED brought in by [facility] Stroke Unit, c/o slurred speech that began after 3pm today. Per EMS, pt's grandson noticed slurred speech at around 3pm, called pt's daughter and when she came home she found her on the floor trying to get up. EMS was called at 7:29pm. Last known normal at 3pm today. Pt reports she has had dizziness for years, and tonight after brushing her teeth she felt dizzy, so she layed down. Pt denies LOC, denies hitting her head. Pt states dizziness has improved at this time. Denies taking any blood thinners. Denies seeing neurologist. Denies CP, SOB, fever, nausea, vomiting. Normal CT of head from [facility] per EMS. 71 y/o female with PMHx of stroke, HTN, HLD, Diabetes, presents to the ED brought in by [facility] Stroke Unit, c/o slurred speech that began after 3pm today. Per EMS, pt's grandson noticed slurred speech, called pt's daughter and when she came home she found her on the floor trying to get up. EMS was called at 7:29pm. Last known normal at 3pm today. Pt reports she has had dizziness for years, and tonight after brushing her teeth she felt dizzy, so she layed down. Pt denies LOC, denies hitting her head. Pt states slurred speech and dizziness has improved at this time. Denies taking any blood thinners. Denies seeing neurologist. Denies CP, SOB, fever, nausea, vomiting. Normal CT of head from [facility] per EMS. 71 y/o female with PMHx of stroke, HTN, HLD, Diabetes, presents to the ED brought in by [facility] Stroke Unit, c/o slurred speech that began after 3pm today. Per EMS, pt's grandson noticed slurred speech, called pt's daughter and when she came home she found her on the floor trying to get up. EMS was called at 7:29pm. Last known normal at 3pm today. Pt reports she has had dizziness for years, and tonight after brushing her teeth she felt dizzy, so she layed down. Pt denies LOC, denies hitting her head. Pt states slurred speech and dizziness has improved at this time. Denies taking any blood thinners. Denies seeing neurologist. Denies CP, SOB, fever, nausea, vomiting. Normal CT of head from [facility] per EMS. CTA head and neck done but had difficulty submitting the image to [place].

## 2021-01-01 NOTE — ED CDU PROVIDER INITIAL DAY NOTE - FAMILY HISTORY
Father  Still living? Unknown  Family history of cerebrovascular accident (CVA), Age at diagnosis: Age Unknown     Mother  Still living? Unknown  Family history of hypertension, Age at diagnosis: Age Unknown

## 2021-01-01 NOTE — ED CDU PROVIDER INITIAL DAY NOTE - OBJECTIVE STATEMENT
71 y/o female with PMHx of stroke, HTN, HLD, Diabetes, presents to the ED brought in by [facility] Stroke [facility], c/o slurred speech that began after 3pm today. Per EMS, pt's grandson noticed slurred speech, called pt's daughter and when she came home she found her on the floor trying to get up. EMS was called at 7:29pm. Last known normal at 3pm today. Pt reports she has had dizziness for years, and tonight after brushing her teeth she felt dizzy, so she layed down. Pt denies LOC, denies hitting her head. Pt states slurred speech and dizziness has improved at this time. Denies taking any blood thinners. Denies seeing neurologist. Denies CP, SOB, fever, nausea, vomiting. Normal CT of head from [facility] per EMS. CTA head and neck done but had difficulty submitting the image to [place].    Pt back to baseline at this time. will admit to observation for mri, mra head and neck.

## 2021-01-01 NOTE — ED PROVIDER NOTE - PHYSICAL EXAMINATION
VITAL SIGNS: I have reviewed nursing notes and confirm.  CONSTITUTIONAL: Well-developed; well-nourished; in no acute distress.  SKIN: Skin exam is warm and dry, no acute rash.  HEAD: Normocephalic; atraumatic.  EYES: PERRL, EOM intact; conjunctiva and sclera clear.  ENT: No nasal discharge; airway clear. Throat clear.  NECK: Supple; non tender.    CARD: S1, S2 normal; Regular rate and rhythm.  RESP: No wheezes,  no rales or rhonchi.   ABD:  soft; non-distended; non-tender;   EXT: Normal ROM. No clubbing, cyanosis or edema.  NEURO: Alert, oriented. Grossly unremarkable. No focal deficits. no facial droop, moves all extremities. No arm drift, no leg drift. Sensation equal and intact.  PSYCH: Cooperative, appropriate.

## 2021-01-01 NOTE — ED CDU PROVIDER INITIAL DAY NOTE - PMH
CVA (cerebral vascular accident)    Diabetes mellitus    HLD (hyperlipidemia)    HTN (hypertension)

## 2021-01-01 NOTE — ED CDU PROVIDER INITIAL DAY NOTE - ATTENDING CONTRIBUTION TO CARE
I, Adryan Mejia, have personally performed a face to face diagnostic evaluation on this patient. I have reviewed the ACP note and agree with the history, exam and plan of care, except as noted.    71 yo F hx of CVA, HTN, HDL, DM p/w slurred speech that resolved. last seen normal 3 pm. not a candidate for TPA. CT head negative by [facility] stroke unit. blood work unremarkable. placed in Obs pending MRI.

## 2021-01-01 NOTE — ED CDU PROVIDER INITIAL DAY NOTE - NS ED ROS FT
Review of Systems  CONSTITUTIONAL - no  fever, no diaphoresis, no weight change  SKIN - no rash  HEMATOLOGIC - no bleeding, no bruising  EYES - no eye pain, no blurred vision  ENT - no change in hearing, no pain  RESPIRATORY - no shortness of breath, no cough  CARDIAC - no chest pain, no palpitations  GI - no abd pain, no nausea, no vomiting, no diarrhea, no constipation, no bleeding  GENITO-URINARY - no discharge, no dysuria; no hematuria,   ENDO - no polydypsia, no polyurea, no heat/no cold intolerance  MUSCULOSKELETAL - no joint pain, no swelling, no redness  NEUROLOGIC - no weakness, no headache, no anesthesia, no paresthesias (+) slurred speech (+) dizziness.   PSYCH - no anxiety, non suicidal, non homicidal, no hallucination, no depression

## 2021-01-01 NOTE — ED CDU PROVIDER INITIAL DAY NOTE - MEDICAL DECISION MAKING DETAILS
71 y/o female with PMHx of stroke, HTN, HLD, Diabetes, presents to the ED brought in by [facility] Stroke [facility], c/o slurred speech that began after 3pm today. Ct negative in alpha, cta unable to be transmitted, pt back to baseline, will obtain mra, will admit to observation for mri,

## 2021-01-02 PROBLEM — I10 ESSENTIAL (PRIMARY) HYPERTENSION: Chronic | Status: ACTIVE | Noted: 2018-10-20

## 2021-01-02 LAB
A1C WITH ESTIMATED AVERAGE GLUCOSE RESULT: 6.3 % — HIGH (ref 4–5.6)
APPEARANCE UR: CLEAR — SIGNIFICANT CHANGE UP
BILIRUB UR-MCNC: NEGATIVE — SIGNIFICANT CHANGE UP
CHOLEST SERPL-MCNC: 140 MG/DL — SIGNIFICANT CHANGE UP
COLOR SPEC: YELLOW — SIGNIFICANT CHANGE UP
DIFF PNL FLD: ABNORMAL
EPI CELLS # UR: SIGNIFICANT CHANGE UP
ESTIMATED AVERAGE GLUCOSE: 134 MG/DL — HIGH (ref 68–114)
GLUCOSE BLDC GLUCOMTR-MCNC: 118 MG/DL — HIGH (ref 70–99)
GLUCOSE BLDC GLUCOMTR-MCNC: 128 MG/DL — HIGH (ref 70–99)
GLUCOSE BLDC GLUCOMTR-MCNC: 85 MG/DL — SIGNIFICANT CHANGE UP (ref 70–99)
GLUCOSE UR QL: NEGATIVE MG/DL — SIGNIFICANT CHANGE UP
HDLC SERPL-MCNC: 73 MG/DL — SIGNIFICANT CHANGE UP
KETONES UR-MCNC: ABNORMAL
LEUKOCYTE ESTERASE UR-ACNC: ABNORMAL
LIPID PNL WITH DIRECT LDL SERPL: 56 MG/DL — SIGNIFICANT CHANGE UP
NITRITE UR-MCNC: NEGATIVE — SIGNIFICANT CHANGE UP
NON HDL CHOLESTEROL: 67 MG/DL — SIGNIFICANT CHANGE UP
PH UR: 8 — SIGNIFICANT CHANGE UP (ref 5–8)
PROT UR-MCNC: 15 MG/DL
RBC CASTS # UR COMP ASSIST: SIGNIFICANT CHANGE UP /HPF (ref 0–4)
SARS-COV-2 RNA SPEC QL NAA+PROBE: SIGNIFICANT CHANGE UP
SP GR SPEC: 1.01 — SIGNIFICANT CHANGE UP (ref 1.01–1.02)
TRIGL SERPL-MCNC: 53 MG/DL — SIGNIFICANT CHANGE UP
UROBILINOGEN FLD QL: NEGATIVE MG/DL — SIGNIFICANT CHANGE UP
WBC UR QL: SIGNIFICANT CHANGE UP

## 2021-01-02 PROCEDURE — 70551 MRI BRAIN STEM W/O DYE: CPT | Mod: 26

## 2021-01-02 PROCEDURE — 99226: CPT

## 2021-01-02 PROCEDURE — 70544 MR ANGIOGRAPHY HEAD W/O DYE: CPT | Mod: 26,59

## 2021-01-02 PROCEDURE — 70547 MR ANGIOGRAPHY NECK W/O DYE: CPT | Mod: 26

## 2021-01-02 RX ADMIN — AMLODIPINE BESYLATE 5 MILLIGRAM(S): 2.5 TABLET ORAL at 05:38

## 2021-01-02 RX ADMIN — Medication 50 MILLIGRAM(S): at 05:38

## 2021-01-02 NOTE — ED ADULT NURSE REASSESSMENT NOTE - GENERAL PATIENT STATE
comfortable appearance/cooperative/smiling/interactive
comfortable appearance/cooperative/resting/sleeping/smiling/interactive
comfortable appearance/cooperative/resting/sleeping/smiling/interactive

## 2021-01-02 NOTE — ED ADULT NURSE REASSESSMENT NOTE - EENT ASSESSMENT, MLM
Patient notified by phone of need to increase his dose of levemir. Per Dr. Sonny Ramos patient to increase his morning dose to 26 units and continue his 20 units at night. - - -

## 2021-01-02 NOTE — ED CDU PROVIDER SUBSEQUENT DAY NOTE - PROGRESS NOTE DETAILS
pt was at MRI during morning rounds   MRI resulted showing old areas of ischemia lacunar infarcts in basal ganglia.  all results reviewed with patient. states that she is taking all prescribed medications. pt states that she is still feeling dizzy rates 5/10 worse with movements. walks with walker and cane at home. lives with daughter. PT mario ordered.   resting comfortably, eating breakfast.

## 2021-01-03 VITALS
HEART RATE: 68 BPM | TEMPERATURE: 99 F | RESPIRATION RATE: 18 BRPM | OXYGEN SATURATION: 98 % | SYSTOLIC BLOOD PRESSURE: 113 MMHG | DIASTOLIC BLOOD PRESSURE: 71 MMHG

## 2021-01-03 LAB
CHOLEST SERPL-MCNC: 159 MG/DL — SIGNIFICANT CHANGE UP
CULTURE RESULTS: SIGNIFICANT CHANGE UP
GLUCOSE BLDC GLUCOMTR-MCNC: 104 MG/DL — HIGH (ref 70–99)
HDLC SERPL-MCNC: 79 MG/DL — SIGNIFICANT CHANGE UP
LIPID PNL WITH DIRECT LDL SERPL: 66 MG/DL — SIGNIFICANT CHANGE UP
NON HDL CHOLESTEROL: 80 MG/DL — SIGNIFICANT CHANGE UP
SPECIMEN SOURCE: SIGNIFICANT CHANGE UP
TRIGL SERPL-MCNC: 71 MG/DL — SIGNIFICANT CHANGE UP

## 2021-01-03 PROCEDURE — 86901 BLOOD TYPING SEROLOGIC RH(D): CPT

## 2021-01-03 PROCEDURE — 93005 ELECTROCARDIOGRAM TRACING: CPT

## 2021-01-03 PROCEDURE — 99284 EMERGENCY DEPT VISIT MOD MDM: CPT | Mod: 25

## 2021-01-03 PROCEDURE — 70547 MR ANGIOGRAPHY NECK W/O DYE: CPT

## 2021-01-03 PROCEDURE — 71045 X-RAY EXAM CHEST 1 VIEW: CPT

## 2021-01-03 PROCEDURE — 85610 PROTHROMBIN TIME: CPT

## 2021-01-03 PROCEDURE — 81001 URINALYSIS AUTO W/SCOPE: CPT

## 2021-01-03 PROCEDURE — 99217: CPT

## 2021-01-03 PROCEDURE — G0378: CPT

## 2021-01-03 PROCEDURE — 85025 COMPLETE CBC W/AUTO DIFF WBC: CPT

## 2021-01-03 PROCEDURE — 84484 ASSAY OF TROPONIN QUANT: CPT

## 2021-01-03 PROCEDURE — 70551 MRI BRAIN STEM W/O DYE: CPT

## 2021-01-03 PROCEDURE — 97161 PT EVAL LOW COMPLEX 20 MIN: CPT

## 2021-01-03 PROCEDURE — 85730 THROMBOPLASTIN TIME PARTIAL: CPT

## 2021-01-03 PROCEDURE — 36415 COLL VENOUS BLD VENIPUNCTURE: CPT

## 2021-01-03 PROCEDURE — U0003: CPT

## 2021-01-03 PROCEDURE — 87086 URINE CULTURE/COLONY COUNT: CPT

## 2021-01-03 PROCEDURE — 80061 LIPID PANEL: CPT

## 2021-01-03 PROCEDURE — 80053 COMPREHEN METABOLIC PANEL: CPT

## 2021-01-03 PROCEDURE — 86900 BLOOD TYPING SEROLOGIC ABO: CPT

## 2021-01-03 PROCEDURE — U0005: CPT

## 2021-01-03 PROCEDURE — 82962 GLUCOSE BLOOD TEST: CPT

## 2021-01-03 PROCEDURE — 86850 RBC ANTIBODY SCREEN: CPT

## 2021-01-03 PROCEDURE — 83036 HEMOGLOBIN GLYCOSYLATED A1C: CPT

## 2021-01-03 PROCEDURE — 70544 MR ANGIOGRAPHY HEAD W/O DYE: CPT | Mod: XU

## 2021-01-03 RX ORDER — ASPIRIN/CALCIUM CARB/MAGNESIUM 324 MG
81 TABLET ORAL DAILY
Refills: 0 | Status: DISCONTINUED | OUTPATIENT
Start: 2021-01-03 | End: 2021-01-06

## 2021-01-03 RX ADMIN — AMLODIPINE BESYLATE 5 MILLIGRAM(S): 2.5 TABLET ORAL at 08:22

## 2021-01-03 RX ADMIN — Medication 50 MILLIGRAM(S): at 08:22

## 2021-01-03 NOTE — PROVIDER CONTACT NOTE (OTHER) - SITUATION
Chart reviewed, contents noted. MD order received for PT eval. PT evaluation completed; see chart for details.

## 2021-01-03 NOTE — PROVIDER CONTACT NOTE (OTHER) - ASSESSMENT
Pt. educated on use of call bell for all needs/transfers with assist. PT will follow. Pain 0/10 pre and post session. Discussed with RN and PA.

## 2021-01-03 NOTE — ED CDU PROVIDER SUBSEQUENT DAY NOTE - HISTORY
Pt is resting over night comfortably , no compliant or concern over night .  pending AM Pt evaluation
no acute events overnight, pending mri

## 2021-01-03 NOTE — ED CDU PROVIDER DISPOSITION NOTE - NSFOLLOWUPINSTRUCTIONS_ED_ALL_ED_FT
please follow with the vestibular clinic   new or worsening symptoms please return to the ER   please use rolling walker at home.   please follow with PMD

## 2021-01-03 NOTE — PHYSICAL THERAPY INITIAL EVALUATION ADULT - PERTINENT HX OF CURRENT PROBLEM, REHAB EVAL
69 y/o female with PMHx of stroke, HTN, HLD, Diabetes, presents to the ED brought in by [facility], c/o slurred speech that began after 3pm today.

## 2021-01-03 NOTE — ED CDU PROVIDER DISPOSITION NOTE - CLINICAL COURSE
69 yo female presenting to the ER with episode of dizziness and slurred speech. placed in observation for MRI and PT. found to have ischemic changes on MRI without signs of an acute stroke, held for am pt evaluation and cleared for discharge with fu with the vestibular clinic

## 2021-01-03 NOTE — ED CDU PROVIDER DISPOSITION NOTE - PATIENT PORTAL LINK FT
You can access the FollowMyHealth Patient Portal offered by [facility] by registering at the following website: [email or web address]. By joining [employer]’s FollowMyHealth portal, you will also be able to view your health information using other applications (apps) compatible with our system.

## 2021-01-03 NOTE — ED ADULT NURSE REASSESSMENT NOTE - NURSING NEURO LEVEL OF CONSCIOUSNESS
alert and awake/follows commands

## 2021-01-03 NOTE — PROVIDER CONTACT NOTE (OTHER) - ACTION/TREATMENT ORDERED:
Short term goals (2-3 visits): bed mobility: independent, transfers: S with RW, gait: Amb 150 ft with RW supervision

## 2021-01-03 NOTE — PROVIDER CONTACT NOTE (OTHER) - BACKGROUND
Pt. with increasing dizziness during and after ambulation. BP in bed 136/80 and 130/75 after amb. Pt. left OOB in chair, +CB, with all needs in reach.

## 2021-01-03 NOTE — ED ADULT NURSE REASSESSMENT NOTE - NS ED NURSE REASSESS COMMENT FT1
Assumed care of the patient @0730. Pt A&Ox4. No respiratory distress, VSS afebrile. Pt c/o dizziness. Patient in understanding of plan of care. Patient with no further questions for the RN. Resting in comfort. Call bell within reach and encouraged to use when assistance needed. Will continue to monitor.
Pt resting comfortably in bed no complaints for RN at this time. NIH 0 VSS.
Pt VSS, resting comfortably in bed. NIH of 0 no complaints at this time will continue to monitor
Assumed care of the Pt at 1930. Verbal report received from KRISTA Quick. Pt is AOx4 in no acute distress. Pt denies any chest pain and SOB. Pt states she still has slight dizziness. Pt with an NIH of 0. Pt VSS, breathing is even and unlabored, IV patent. Pt NSR on CM as per monitor tech. Pt pending PT eval. Pt  in understanding of plan of care wait time explained, plan of care explained, will continue to monitor.

## 2021-01-03 NOTE — ED CDU PROVIDER SUBSEQUENT DAY NOTE - FAMILY HISTORY
Father  Still living? Unknown  Family history of cerebrovascular accident (CVA), Age at diagnosis: Age Unknown     Mother  Still living? Unknown  Family history of hypertension, Age at diagnosis: Age Unknown  
Father  Still living? Unknown  Family history of cerebrovascular accident (CVA), Age at diagnosis: Age Unknown     Mother  Still living? Unknown  Family history of hypertension, Age at diagnosis: Age Unknown

## 2021-01-03 NOTE — ED CDU PROVIDER DISPOSITION NOTE - CARE PROVIDERS DIRECT ADDRESSES
,DirectAddress_Unknown,[email or web address]

## 2021-01-03 NOTE — ED CDU PROVIDER SUBSEQUENT DAY NOTE - PMH
CVA (cerebral vascular accident)    Diabetes mellitus    HLD (hyperlipidemia)    HTN (hypertension)    
CVA (cerebral vascular accident)    Diabetes mellitus    HLD (hyperlipidemia)    HTN (hypertension)

## 2021-01-03 NOTE — ED CDU PROVIDER DISPOSITION NOTE - PROVIDER TOKENS
PROVIDER:[TOKEN:[[id number]],FOLLOWUP:[7-10 Days]],PROVIDER:[TOKEN:[[id number]],FOLLOWUP:[7-10 Days]]

## 2021-01-03 NOTE — PHYSICAL THERAPY INITIAL EVALUATION ADULT - ACTIVE RANGE OF MOTION EXAMINATION, REHAB EVAL
with exception to right elbow due to old fx - 20 degrees extension. 20 degrees to 80 degrees flexion/no Active ROM deficits were identified

## 2021-01-03 NOTE — ED ADULT NURSE REASSESSMENT NOTE - NIH STROKE SCALE: 3. VISUAL, QM
(0) No visual loss

## 2021-01-03 NOTE — ED CDU PROVIDER SUBSEQUENT DAY NOTE - NS ED ROS FT
Review of Systems  CONSTITUTIONAL - no  fever, no diaphoresis, no weight change  SKIN - no rash  HEMATOLOGIC - no bleeding, no bruising  EYES - no eye pain, no blurred vision  ENT - no change in hearing, no pain  RESPIRATORY - no shortness of breath, no cough  CARDIAC - no chest pain, no palpitations  GI - no abd pain, no nausea, no vomiting, no diarrhea, no constipation, no bleeding  GENITO-URINARY - no discharge, no dysuria; no hematuria,   ENDO - no polydypsia, no polyurea, no heat/no cold intolerance  MUSCULOSKELETAL - no joint pain, no swelling, no redness  NEUROLOGIC - no weakness, no headache, no anesthesia, no paresthesias (+) slurred speech (+) dizziness.   PSYCH - no anxiety, non suicidal, non homicidal, no hallucination, no depression
Review of Systems  CONSTITUTIONAL - no  fever, no diaphoresis, no weight change  SKIN - no rash  HEMATOLOGIC - no bleeding, no bruising  EYES - no eye pain, no blurred vision  ENT - no change in hearing, no pain  RESPIRATORY - no shortness of breath, no cough  CARDIAC - no chest pain, no palpitations  GI - no abd pain, no nausea, no vomiting, no diarrhea, no constipation, no bleeding  GENITO-URINARY - no discharge, no dysuria; no hematuria,   ENDO - no polydypsia, no polyurea, no heat/no cold intolerance  MUSCULOSKELETAL - no joint pain, no swelling, no redness  NEUROLOGIC - no weakness, no headache, no anesthesia, no paresthesias (+) slurred speech (+) dizziness.   PSYCH - no anxiety, non suicidal, non homicidal, no hallucination, no depression

## 2021-01-03 NOTE — PHYSICAL THERAPY INITIAL EVALUATION ADULT - ADDITIONAL COMMENTS
Pt. lives with her daughter and grandchildren. Pt. daughter at home and able to assist as needed. Pt. with no stairs to enter and no stairs inside. Pt. reports she was independent at baseline but over past few weeks she has been having increasing difficulty with amb due to dizziness. Pt. owns a rollator and SAC. Pt. has been using a device on and off just prior to admission.

## 2021-01-03 NOTE — ED CDU PROVIDER DISPOSITION NOTE - CARE PROVIDER_API CALL
Yadira Her (NP; RN)  NP in Adult Health  [address] [place] [address]  Phone: [phone]  Fax: [phone]  Follow Up Time: 7-10 Days    Alonzo Muniz  NEUROLOGY  [address], [address]  [place] [address]  Phone: [phone]  Fax: [phone]  Follow Up Time: 7-10 Days

## 2021-01-03 NOTE — ED ADULT NURSE REASSESSMENT NOTE - COMFORT CARE
AM care given, incont care given/meal provided/repositioned/warm blanket provided
AM care with asst./assisted to bathroom/meal provided/repositioned/warm blanket provided
meal provided/plan of care explained
assisted to bathroom/meal provided/plan of care explained/po fluids offered/wait time explained
assisted to bathroom/plan of care explained/po fluids offered/wait time explained
Pt on premafit/plan of care explained/wait time explained

## 2021-01-03 NOTE — ED CDU PROVIDER DISPOSITION NOTE - ATTENDING CONTRIBUTION TO CARE
I, Erika Parr, participated in the care of this patient with the PA. I discussed the history and physical exam findings as well as lab results and plan of care with the PA. I agree with PA's history, physical and assessment. I agree with final disposition.

## 2021-01-03 NOTE — ED CDU PROVIDER SUBSEQUENT DAY NOTE - MEDICAL DECISION MAKING DETAILS
71 y/o female with PMHx of stroke, HTN, HLD, Diabetes, presents to the ED brought in by [facility], c/o slurred speech that began after 3pm today. Ct negative in alpha, cta unable to be transmitted, pt back to baseline, will obtain mra, will admit to observation for mri,
69 y/o female with PMHx of stroke, HTN, HLD, Diabetes, presents to the ED brought in by [facility], c/o slurred speech that began after 3pm today. Ct negative in alpha,   MRI: With No acute infarct. Multiple small foci of T2/FLAIR hyperintense signal in the periventricular white matter, suggestive of mild to moderate chronic microvascular ischemic change. Small chronic infarct in the right occipital lobe. Small lacunar infarcts in the bilateral basal ganglia.  start the pt on asa 81Mg   cholesterol panel in AM pending   PT eval in AM

## 2021-01-03 NOTE — ED CDU PROVIDER SUBSEQUENT DAY NOTE - ATTENDING CONTRIBUTION TO CARE
I, Erika Parr, participated in the care of this patient with the PA. I discussed the history and physical exam findings as well as lab results and plan of care with the PA. I agree with PA's history, physical and assessment.
I, Erika Parr, participated in the care of this patient with the PA. I discussed the history and physical exam findings as well as lab results and plan of care with the PA. I agree with PA's history, physical and assessment.

## 2021-01-03 NOTE — ED CDU PROVIDER SUBSEQUENT DAY NOTE - PHYSICAL EXAMINATION
VITAL SIGNS: I have reviewed nursing notes and confirm.  CONSTITUTIONAL: Well-developed; well-nourished; in no acute distress.  SKIN: Skin exam is warm and dry, no acute rash.  HEAD: Normocephalic; atraumatic.  EYES: PERRL, EOM intact; conjunctiva and sclera clear.  ENT: No nasal discharge; airway clear. Throat clear.  NECK: Supple; non tender.    CARD: S1, S2 normal; Regular rate and rhythm.  RESP: No wheezes,  no rales or rhonchi.   ABD:  soft; non-distended; non-tender;   EXT: Normal ROM. No clubbing, cyanosis or edema.  NEURO: Alert, oriented. Grossly unremarkable. No focal deficits. no facial droop, moves all extremities. No arm drift, no leg drift. Sensation equal and intact.  PSYCH: Cooperative, appropriate.
VITAL SIGNS: I have reviewed nursing notes and confirm.  CONSTITUTIONAL: Well-developed; well-nourished; in no acute distress.  SKIN: Skin exam is warm and dry, no acute rash.  HEAD: Normocephalic; atraumatic.  EYES: PERRL, EOM intact; conjunctiva and sclera clear.  ENT: No nasal discharge; airway clear. Throat clear.  NECK: Supple; non tender.    CARD: S1, S2 normal; Regular rate and rhythm.  RESP: No wheezes,  no rales or rhonchi.   ABD:  soft; non-distended; non-tender;   EXT: Normal ROM. No clubbing, cyanosis or edema.  NEURO: Alert, oriented. Grossly unremarkable. No focal deficits. no facial droop, moves all extremities. No arm drift, no leg drift. Sensation equal and intact.  PSYCH: Cooperative, appropriate.

## 2023-05-04 NOTE — ED ADULT NURSE NOTE - NS TRANSFER PATIENT BELONGINGS
Screening Questions  BLUE  KIND OF PREP RED  LOCATION [review exclusion criteria] GREEN  SEDATION TYPE        Y Are you active on mychart?       Linda Bennett Ordering/Referring Provider?        HP What type of coverage do you have?      N Have you had a positive covid test in the last 14 days?     27.03 1. BMI  [BMI 40+ - review exclusion criteria& smart-phrase document]    Y  2. Are you able to give consent for your medical care? [IF NO,RN REVIEW]          N  3. Are you taking any prescription pain medications on a routine schedule   (ex narcotics: oxycodone, roxicodone, oxycontin,  and percocet)? [RN Review]        N  3a. EXTENDED PREP What kind of prescription?     N 4. Do you have any chemical dependencies such as alcohol, street drugs, or methadone?        **If yes 3- 5 , please schedule with MAC sedation.**          IF YES TO ANY 6 - 10 - HOSPITAL SETTING ONLY.     N 6.   Do you need assistance transferring?     N 7.   Have you had a heart or lung transplant?    N 8.   Are you currently on dialysis?   N 9.   Do you use daily home oxygen?   N 10. Do you take nitroglycerin?   10a. N If yes, how often?     11. [FEMALES]  N Are you currently pregnant?    11a. N If yes, how many weeks? [ Greater than 12 weeks, OR NEEDED]    N 12. Do you have Pulmonary Hypertension? *NEED PAC APPT AT [facility] w/ MAC*     N 13. [review exclusion criteria]  Do you have any implantable devices in your body (pacemaker, defib, LVAD)?    N 14. In the past 6 months, have you had any heart related issues including cardiomyopathy or heart attack?     14a. N If yes, did it require cardiac stenting if so when?     N 15. Have you had a stroke or Transient ischemic attack (TIA - aka  mini stroke ) within 6 months?      N 16. Do you have mod to severe Obstructive Sleep Apnea?  [Hospital only]    N 17. Do you have SEVERE AND UNCONTROLLED asthma? *NEED PAC APPT AT [facility] w/MAC*     18. Are you currently taking any blood thinners?     18a. No.  "Continue to 19.   18b. Yes/no Blood Thinner: No [CONTINUE TO #19]    N 19. Do you take the medication Phentermine?    19a. If yes, \"Hold for 7 days before procedure.  Please consult your prescribing provider if you have questions about holding this medication.\"     N  20. Do you have chronic kidney disease?      N  21. Do you have a diagnosis of diabetes?     N  22. On a regular basis do you go 3-5 days between bowel movements?     N 23. Preferred LOCAL Pharmacy for Pre Prescription    [ LIST ONLY ONE PHARMACY]     [facility] #[id number] - [place], [place] - [facility]        - CLOSING REMINDERS -    Informed patient they will need an adult    Cannot take any type of public or medical transportation alone    Conscious Sedation- Needs  for 6 hours after the procedure       MAC/General-Needs  for 24 hours after procedure    Pre-Procedure Covid test to be completed [[facility] PCR Testing Required]    Confirmed Nurse will call to complete assessment       - SCHEDULING DETAILS -  N Hospital Setting Required? If yes, what is the exclusion?: SHIRA CHAMORRO  Surgeon    8/7/23  Date of Procedure  Lower Endoscopy [Colonoscopy]  Type of Procedure Scheduled  [facility]   MIRALAX GATORADE WITHOUT MAGNEISUM CITRATE Which Colonoscopy Prep was Sent?     MODERATE Sedation Type     N PAC / Pre-op Required                 " Clothing

## 2023-10-11 NOTE — ED ADULT NURSE NOTE - NS ED TRIAGE CLINICAL UPGRADE PROVIDER FT
Per MP:  Medical record request/updates    Can we obtain additional information of    Musa Cook's eye examination with Dr. Axel Carroll? Was dilated eye exam performed on 4/25/2023? Need to update care gap. Cesar Rod-- can she side release of records? She did previously but she has history of CVA and I need additional information. Thank you! no for pain, due to pt's vitals and sx's.

## 2024-07-03 NOTE — ED ADULT TRIAGE NOTE - NS ED NURSE BANDS TYPE
-- DO NOT REPLY / DO NOT REPLY ALL --  -- This inbox is not monitored  -- Message is from [facility] ([facility]) --      Message Type:  Refill Medication   Refill request for Pended medication named: albuterol inhaler   Preferred pharmacy verified, and selected.   [id number] - [place], [place] - [address]    Is the patient OUT of Medication?  Yes and Medication Refills handled by [facility] Clinical        Message:  out of inhaler                     Name band;

## 2024-08-16 NOTE — H&P ADULT - NSHPOUTPATIENTPROVIDERS_GEN_ALL_CORE
Dr. Hinojosa Render In Strict Bullet Format?: No Discontinue Regimen: Clobetasol 0.05% cream apply qd  (too expensive) Detail Level: Zone Continue Regimen: Calcipotriene 0.005% apply qd Monday through Friday (uses [facility], not covered by insurance) Initiate Treatment: Halobetasol 0.05% ointment (cream wasn’t covered) apply bid to affected areas on legs